# Patient Record
Sex: FEMALE | Race: WHITE | Employment: PART TIME | URBAN - NONMETROPOLITAN AREA
[De-identification: names, ages, dates, MRNs, and addresses within clinical notes are randomized per-mention and may not be internally consistent; named-entity substitution may affect disease eponyms.]

---

## 2020-11-09 ENCOUNTER — OFFICE VISIT (OUTPATIENT)
Dept: OBGYN CLINIC | Age: 30
End: 2020-11-09
Payer: COMMERCIAL

## 2020-11-09 VITALS
DIASTOLIC BLOOD PRESSURE: 74 MMHG | HEIGHT: 65 IN | WEIGHT: 140.1 LBS | SYSTOLIC BLOOD PRESSURE: 132 MMHG | BODY MASS INDEX: 23.34 KG/M2

## 2020-11-09 PROCEDURE — 99395 PREV VISIT EST AGE 18-39: CPT | Performed by: NURSE PRACTITIONER

## 2020-11-09 SDOH — HEALTH STABILITY: MENTAL HEALTH: HOW OFTEN DO YOU HAVE A DRINK CONTAINING ALCOHOL?: MONTHLY OR LESS

## 2020-11-09 NOTE — PROGRESS NOTES
YEARLY PHYSICAL    Date of service: 2020    Lynette Daniels  Is a 35 y.o.   female    PT's PCP is: No primary care provider on file. : 1990                                             Subjective:       Patient's last menstrual period was 10/18/2020. Are your menses regular: yes, only 2 since IUD removal    OB History    Para Term  AB Living   1 1 1     1   SAB TAB Ectopic Molar Multiple Live Births             1      # Outcome Date GA Lbr Reginald/2nd Weight Sex Delivery Anes PTL Lv   1 Term 1 40w0d    Vag-Spont   FILIPPO        Social History     Tobacco Use   Smoking Status Never Smoker   Smokeless Tobacco Never Used        Social History     Substance and Sexual Activity   Alcohol Use Yes    Frequency: Monthly or less       Family History   Problem Relation Age of Onset    Alzheimer's Disease Maternal Grandmother     Hypertension Father        Any family history of breast or ovarian cancer: No    Any family history of blood clots: No      Allergies: Ceclor [cefaclor]      Current Outpatient Medications:     Prenatal Vit-Fe Fumarate-FA (PRENATAL VITAMIN PO), Take by mouth, Disp: , Rfl:     Omega-3 Fatty Acids (FISH OIL PO), Take by mouth, Disp: , Rfl:     Ascorbic Acid (VITAMIN C ER PO), Take by mouth, Disp: , Rfl:     Social History     Substance and Sexual Activity   Sexual Activity Yes    Partners: Male       Any bleeding or pain with intercourse: No    Last Yearly:  2019    Last pap: 2019    Last HPV: n/a    Last Mammogram: n/a    Last Dexascan n/a    Last colorectal screen- n/a    Do you do self breast exams: encouraged monthly SBE    History reviewed. No pertinent past medical history. History reviewed. No pertinent surgical history.     Family History   Problem Relation Age of Onset    Alzheimer's Disease Maternal Grandmother     Hypertension Father        Chief Complaint   Patient presents with  Gynecologic Exam     Pap not due. PE:  Vital Signs  Blood pressure 132/74, height 5' 5\" (1.651 m), weight 140 lb 1.6 oz (63.5 kg), last menstrual period 10/18/2020. Estimated body mass index is 23.31 kg/m² as calculated from the following:    Height as of this encounter: 5' 5\" (1.651 m). Weight as of this encounter: 140 lb 1.6 oz (63.5 kg). HPI: Patient here for annual exam. Feeling well, voices no concerns. Denies breast/pelvic pain. Negative pap in 2019 per [facility]. Monthly menses since IUD removal.    Review of Systems   All other systems reviewed and are negative. Objective  Heent:   negative   Cor: regular rate and rhythm, no murmurs              Pul:clear to auscultation bilaterally- no wheezes, rales or rhonchi, normal air movement, no respiratory distress      GI: Abdomen soft, non-tender. BS normal. No masses,  No organomegaly           Extremities: normal strength, tone, and muscle mass, ROM of all joints is normal   Breasts: Breast:normal appearance, no masses or tenderness, No nipple retraction or dimpling, No nipple discharge or bleeding   Pelvic Exam: GENITAL/URINARY:  External Genitalia:  General appearance; normal, Hair distribution; normal, Lesions absent  Urethral Meatus:  Size normal, Location normal, Lesions absent, Prolapse absent  Urethra: Fullness absent, Masses absent  Bladder:  Fullness absent, Masses absent, Tenderness absent, Cystocele absent  Vagina:  General appearance normal, Estrogen effect normal, Discharge absent, Lesions absent, Pelvic support normal  Cervix:  General appearance normal, Lesions absent, Discharge absent, Tenderness absent, Enlargement absent, Nodularity absent  Uterus:  Size normal, Tenderness absent  Adenexa:   Masses absent, Tenderness absent  Anus/Perineum:  Lesions absent and Masses absent                                    Vaginal discharge: no vaginal discharge                            Assessment and Plan          Diagnosis Orders   1. Women's annual routine gynecological examination               I am having Yamilet Awkward maintain her Prenatal Vit-Fe Fumarate-FA (PRENATAL VITAMIN PO), Omega-3 Fatty Acids (FISH OIL PO), and Ascorbic Acid (VITAMIN C ER PO). Return in about 1 year (around 11/9/2021) for yearly. She was also counseled on her preventative health maintenance recommendations and follow-up. There are no Patient Instructions on file for this visit.     Ricky Don,11/10/2020 9:18 AM

## 2021-07-14 ENCOUNTER — TELEPHONE (OUTPATIENT)
Dept: OBGYN CLINIC | Age: 31
End: 2021-07-14

## 2021-07-14 NOTE — TELEPHONE ENCOUNTER
Pt called stating she recently found out she was pregnant and is taking a lot of supplements because she is a  and wondering what ones are safe to take. Patient is going to send a my chart message with a list of all the supplements she is taking.

## 2021-07-23 ENCOUNTER — TELEPHONE (OUTPATIENT)
Dept: OBGYN CLINIC | Age: 31
End: 2021-07-23

## 2021-07-23 NOTE — TELEPHONE ENCOUNTER
Caller states she has a rapid HR and she is wondering if it is normal. Patient states she is 6 weeks pregnant, but was not sure of her OBGYN's name, but knows she see's them at the [facility] office. Patient states when she was working out, her HR was 165 and just walking around it's in the 120's. Patient denies any difficulty breathing or SOB. Writer placed patient on hold to look at office guidelines and patient hung up.

## 2021-08-02 NOTE — TELEPHONE ENCOUNTER
Attempted to call patient to check on her and had to leave a message. She is to call back if still needing anything from our office.

## 2021-08-10 ENCOUNTER — OFFICE VISIT (OUTPATIENT)
Dept: OBGYN CLINIC | Age: 31
End: 2021-08-10
Payer: COMMERCIAL

## 2021-08-10 VITALS
DIASTOLIC BLOOD PRESSURE: 73 MMHG | HEIGHT: 65 IN | BODY MASS INDEX: 22.59 KG/M2 | WEIGHT: 135.6 LBS | SYSTOLIC BLOOD PRESSURE: 121 MMHG

## 2021-08-10 DIAGNOSIS — N91.1 AMENORRHEA, SECONDARY: Primary | ICD-10-CM

## 2021-08-10 PROCEDURE — G8427 DOCREV CUR MEDS BY ELIG CLIN: HCPCS | Performed by: ADVANCED PRACTICE MIDWIFE

## 2021-08-10 PROCEDURE — 99213 OFFICE O/P EST LOW 20 MIN: CPT | Performed by: ADVANCED PRACTICE MIDWIFE

## 2021-08-10 PROCEDURE — G8420 CALC BMI NORM PARAMETERS: HCPCS | Performed by: ADVANCED PRACTICE MIDWIFE

## 2021-08-10 PROCEDURE — 1036F TOBACCO NON-USER: CPT | Performed by: ADVANCED PRACTICE MIDWIFE

## 2021-08-10 ASSESSMENT — ENCOUNTER SYMPTOMS
VOMITING: 0
ABDOMINAL PAIN: 0
NAUSEA: 1

## 2021-08-10 NOTE — PROGRESS NOTES
PROBLEM VISIT     Date of service: 8/10/2021    Gita Alvarado  Is a 35 y.o.  female    PT's PCP is: No primary care provider on file. : 1990                                          Review of Systems   Constitutional: Positive for fatigue. Breast tenderness     Gastrointestinal: Positive for nausea. Negative for abdominal pain and vomiting. Genitourinary: Positive for menstrual problem (amenorrhea related to pregnancy). Negative for vaginal bleeding. Patient's last menstrual period was 2021 (exact date). OB History    Para Term  AB Living   2 1 1     1   SAB TAB Ectopic Molar Multiple Live Births             1      # Outcome Date GA Lbr Reginald/2nd Weight Sex Delivery Anes PTL Lv   2 Current            1 Term 1 40w0d    Vag-Spont   FILIPPO        Social History     Tobacco Use   Smoking Status Never Smoker   Smokeless Tobacco Never Used        Social History     Substance and Sexual Activity   Alcohol Use Yes       Allergies: Ceclor [cefaclor]      Current Outpatient Medications:     Prenatal Vit-Fe Fumarate-FA (PRENATAL VITAMIN PO), Take by mouth, Disp: , Rfl:     Omega-3 Fatty Acids (FISH OIL PO), Take by mouth, Disp: , Rfl:     Ascorbic Acid (VITAMIN C ER PO), Take by mouth, Disp: , Rfl:     Social History     Substance and Sexual Activity   Sexual Activity Yes    Partners: Male       Chief Complaint   Patient presents with    Follow-up     Pt here for viability USN f/u. Pt denies concerns. Physical Exam  Constitutional:       Appearance: Normal appearance. She is normal weight. HENT:      Head: Normocephalic. Eyes:      Pupils: Pupils are equal, round, and reactive to light. Pulmonary:      Effort: Pulmonary effort is normal.   Musculoskeletal:         General: Normal range of motion. Cervical back: Normal range of motion. Neurological:      Mental Status: She is alert and oriented to person, place, and time.    Skin:     General: Skin is warm and dry. Psychiatric:         Behavior: Behavior normal.   Vitals and nursing note reviewed. Vital Signs  Blood pressure 121/73, height 5' 5\" (1.651 m), weight 135 lb 9.6 oz (61.5 kg), last menstrual period 06/13/2021. HPI  USN f/u. USN done for viability. Patient works out daily, did with first uncomplicated pregnancy also. Taking vitamins. Normal first trimester s/s                       Results reviewed today:    USN today - viable IUP, size = dates. CXL 5cm. FHR = 175 beats. Right ovary normal.  Left ovary with corpus luteal cyst.                              Assessment and Plan          Diagnosis Orders   1. Amenorrhea, secondary       Reviewed timing of PNI/NOB. Discussed genetic testing. Discussed protein - currently eating 120 grams daily. Okay to take fish oil, vitamin c supplement. I am having Lynette Daniels maintain her Prenatal Vit-Fe Fumarate-FA (PRENATAL VITAMIN PO), Omega-3 Fatty Acids (FISH OIL PO), and Ascorbic Acid (VITAMIN C ER PO). Return in about 2 weeks (around 8/24/2021) for PNI. She was also counseled on her preventative health maintenance recommendations and follow-up. There are no Patient Instructions on file for this visit.     MAEVE Barker CNM,8/10/2021 12:33 PM                   Electronically signed by MAEVE Barker CNM

## 2021-09-01 ENCOUNTER — HOSPITAL ENCOUNTER (OUTPATIENT)
Age: 31
Setting detail: SPECIMEN
Discharge: HOME | End: 2021-09-01
Payer: COMMERCIAL

## 2021-09-01 ENCOUNTER — INITIAL PRENATAL (OUTPATIENT)
Dept: OBGYN CLINIC | Age: 31
End: 2021-09-01

## 2021-09-01 VITALS
BODY MASS INDEX: 23.19 KG/M2 | DIASTOLIC BLOOD PRESSURE: 62 MMHG | SYSTOLIC BLOOD PRESSURE: 118 MMHG | WEIGHT: 139.2 LBS | HEIGHT: 65 IN

## 2021-09-01 DIAGNOSIS — Z34.81 ENCOUNTER FOR SUPERVISION OF OTHER NORMAL PREGNANCY IN FIRST TRIMESTER: Primary | ICD-10-CM

## 2021-09-01 DIAGNOSIS — Z34.81 ENCOUNTER FOR SUPERVISION OF OTHER NORMAL PREGNANCY IN FIRST TRIMESTER: ICD-10-CM

## 2021-09-01 LAB
ABSOLUTE EOS #: 0.08 K/UL (ref 0–0.44)
ABSOLUTE IMMATURE GRANULOCYTE: 0.03 K/UL (ref 0–0.3)
ABSOLUTE LYMPH #: 1.75 K/UL (ref 1.1–3.7)
ABSOLUTE MONO #: 0.54 K/UL (ref 0.1–1.2)
AMPHETAMINE SCREEN URINE: NEGATIVE
BARBITURATE SCREEN URINE: NEGATIVE
BASOPHILS # BLD: 0 % (ref 0–2)
BASOPHILS ABSOLUTE: <0.03 K/UL (ref 0–0.2)
BENZODIAZEPINE SCREEN, URINE: NEGATIVE
BILIRUBIN URINE: NEGATIVE
BUPRENORPHINE URINE: NORMAL
CANNABINOID SCREEN URINE: NEGATIVE
COCAINE METABOLITE, URINE: NEGATIVE
COLOR: YELLOW
COMMENT UA: ABNORMAL
DIFFERENTIAL TYPE: ABNORMAL
EOSINOPHILS RELATIVE PERCENT: 1 % (ref 1–4)
GLUCOSE URINE: NEGATIVE
HCT VFR BLD CALC: 37.3 % (ref 36.3–47.1)
HEMOGLOBIN: 12.4 G/DL (ref 11.9–15.1)
HEPATITIS B SURFACE ANTIGEN: NONREACTIVE
HEPATITIS C ANTIBODY: NONREACTIVE
HIV AG/AB: NONREACTIVE
IMMATURE GRANULOCYTES: 0 %
KETONES, URINE: NEGATIVE
LEUKOCYTE ESTERASE, URINE: NEGATIVE
LYMPHOCYTES # BLD: 22 % (ref 24–43)
MCH RBC QN AUTO: 30.3 PG (ref 25.2–33.5)
MCHC RBC AUTO-ENTMCNC: 33.2 G/DL (ref 28.4–34.8)
MCV RBC AUTO: 91.2 FL (ref 82.6–102.9)
MDMA URINE: NORMAL
METHADONE SCREEN, URINE: NEGATIVE
METHAMPHETAMINE, URINE: NORMAL
MONOCYTES # BLD: 7 % (ref 3–12)
NITRITE, URINE: NEGATIVE
NRBC AUTOMATED: 0 PER 100 WBC
OPIATES, URINE: NEGATIVE
OXYCODONE SCREEN URINE: NEGATIVE
PDW BLD-RTO: 11.9 % (ref 11.8–14.4)
PH UA: 6.5 (ref 5–8)
PHENCYCLIDINE, URINE: NEGATIVE
PLATELET # BLD: 232 K/UL (ref 138–453)
PLATELET ESTIMATE: ABNORMAL
PMV BLD AUTO: 9.2 FL (ref 8.1–13.5)
PROPOXYPHENE, URINE: NORMAL
PROTEIN UA: NEGATIVE
RBC # BLD: 4.09 M/UL (ref 3.95–5.11)
RBC # BLD: ABNORMAL 10*6/UL
RUBV IGG SER QL: 308.9 IU/ML
SEG NEUTROPHILS: 70 % (ref 36–65)
SEGMENTED NEUTROPHILS ABSOLUTE COUNT: 5.63 K/UL (ref 1.5–8.1)
SPECIFIC GRAVITY UA: 1 (ref 1–1.03)
T. PALLIDUM, IGG: NONREACTIVE
TEST INFORMATION: NORMAL
TRICYCLIC ANTIDEPRESSANTS, UR: NORMAL
TURBIDITY: CLEAR
URINE HGB: NEGATIVE
UROBILINOGEN, URINE: NORMAL
WBC # BLD: 8.1 K/UL (ref 3.5–11.3)
WBC # BLD: ABNORMAL 10*3/UL

## 2021-09-01 PROCEDURE — 0500F INITIAL PRENATAL CARE VISIT: CPT | Performed by: ADVANCED PRACTICE MIDWIFE

## 2021-09-01 NOTE — PATIENT INSTRUCTIONS
Patient Education        Learning About Starting to Breastfeed  Planning ahead     Before your baby is born, plan ahead. Learn all you can about breastfeeding. This helps make breastfeeding easier. · Early in your pregnancy, talk to your doctor or midwife about breastfeeding. · Learn the basics before your baby is born. The staff at hospitals and birthing centers can help you find a lactation specialist. This person is often a nurse who has been trained to teach and advise about breastfeeding. Or you can take a breastfeeding class. · Plan ahead for times when you will need help after your baby is born. You may want to get help from friends and family. You can also join a support group to talk to others who breastfeed. · Buy the equipment you'll need. Examples are breast pads, nipple cream, extra pillows, and nursing bras. Find out about breast pumps too. Getting help from your hospital or birthing center  It's important to have support from the doctors, nurses, and hospital staff who care for you and your baby. Before it's time for you to give birth, ask about the breastfeeding policies at your hospital or birthing center. Look for a hospital or birthing center that has policies for:  · \"Rooming in. \" This policy encourages you to have your baby in the room with you. It can allow you to breastfeed more often. · Supplemental feedings. Tell the staff that your baby is to get only your breast milk from birth. If staff feed your baby water, sugar solution, or formula right after birth without a medical reason, it may make it harder for you to breastfeed. · Pacifiers or artificial nipples. Staff should not give your  these items. They may interfere with breastfeeding. · Follow-up. Find out if your hospital can help you with breastfeeding issues after you go home. See if you can get information on support groups or other contacts.  They might help if you need help setting up and staying with your breastfeeding routine. Your first feeding  It's best to start breastfeeding within 1 hour of birth. For each feeding, you go through these basic steps:  · Get ready for the feeding. Be calm and relaxed, and try not to be distracted. Get some water or juice for yourself. Use two or three pillows to help support your baby while nursing. · Find a breastfeeding position that is comfortable for you and your baby. Examples are the cradle and the football positions. Make sure the baby's head and chest are lined up straight and facing your breast. It's best to switch which breast you start with each time. · Get the baby latched on well. Your baby's mouth needs to be wide open, like a yawn. So you may need to gently touch the middle of your baby's lower lip. When your baby's mouth is open wide, quickly bring the baby onto your nipple and areola. The areola is the dark [place] around your nipple. · Provide a complete feeding. Let your baby decide how long to nurse. Be sure to burp your baby after each breast.  In the first days after birth, your breasts make a thick, yellow liquid called colostrum. This liquid gives your baby nutrients and antibodies against infection. It is all that babies need at first. Your breasts will fill with milk a few days after the birth. Talk to your doctor, midwife, or lactation specialist right away if you are having problems and aren't sure what to do. How often to breastfeed  Plan to breastfeed your baby on demand rather than setting a strict schedule. For the first 2 weeks, be prepared to breastfeed at least 8 times in a 24-hour period. In the first few days, you may need to wake a sleeping baby to feed. If you breastfeed more often, it will help your breasts to produce more milk. After you go home  After you're home, don't be afraid to call your doctor, midwife, or lactation specialist with questions. That's true even if you don't know what's bothering you.  They are used to parents of newborns calling. They can help you figure out if there is a problem, and if so, how to fix it. Plan for times when you will be apart from your baby. Use a breast pump to collect breast milk ahead of time. You can store milk in the refrigerator or freezer. Then it's ready when someone else will be taking care of your baby. Experts recommend waiting about a month until breastfeeding is going well before offering a bottle. Breastfeeding is a learned skill that gets easier over time. You are more likely to succeed if you plan ahead, learn the basic techniques, and know where to get help and support. Where can you learn more? Go to https://chpepiceweb.AvidRetail. org and sign in to your CashYou account. Enter Y067 in the OBOOK box to learn more about \"Learning About Starting to Breastfeed. \"     If you do not have an account, please click on the \"Sign Up Now\" link. Current as of: October 8, 2020               Content Version: 12.9  © 2006-2021 Healthwise, Breaker. Care instructions adapted under license by [facility] ([facility]). If you have questions about a medical condition or this instruction, always ask your healthcare professional. [place] [address] any warranty or liability for your use of this information. Patient Education        Nutrition During Pregnancy: Care Instructions  Your Care Instructions     Healthy eating when you are pregnant is important for you and your baby. It can help you feel well and have a successful pregnancy and delivery. During pregnancy your nutrition needs increase. Even if you have excellent eating habits, your doctor may recommend a multivitamin to make sure you get enough iron and folic acid. Many pregnant women wonder how much weight they should gain. In general, women who were at a healthy weight before they became pregnant should gain between 25 and 35 pounds.  Women who were overweight before pregnancy are usually advised to gain 15 to 25 pounds. Women who were underweight before pregnancy are usually advised to gain 28 to 40 pounds. Your doctor will work with you to set a weight goal that is right for you. Gaining a healthy amount of weight helps you have a healthy baby. Follow-up care is a key part of your treatment and safety. Be sure to make and go to all appointments, and call your doctor if you are having problems. It's also a good idea to know your test results and keep a list of the medicines you take. How can you care for yourself at home? · Eat plenty of fruits and vegetables. Include a variety of orange, yellow, and leafy dark-green vegetables every day. · Choose whole-grain bread, cereal, and pasta. Good choices include whole wheat bread, whole wheat pasta, brown rice, and oatmeal.  · Get 4 or more servings of milk and milk products each day. Good choices include nonfat or low-fat milk, yogurt, and cheese. If you cannot eat milk products, you can get calcium from calcium-fortified products such as orange juice, soy milk, and tofu. Other non-milk sources of calcium include leafy green vegetables, such as broccoli, kale, mustard greens, turnip greens, bok grace, and brussels sprouts. · If you eat meat, pick lower-fat types. Good choices include lean cuts of meat and chicken or turkey without the skin. · Do not eat shark, swordfish, rebel mackerel, or tilefish. They have high levels of mercury, which is dangerous to your baby. You can eat up to 12 ounces a week of fish or shellfish that have low mercury levels. Good choices include shrimp, wild salmon, pollock, and catfish. Limit some other types of fish, such as white (albacore) tuna, to 4 oz (0.1 kg) a week. · Heat lunch meats (such as turkey, ham, or bologna) to 165°F before you eat them. This reduces your risk of getting sick from a kind of bacteria that can be found in lunch meats.   · Do not eat unpasteurized soft cheeses, such as brie, feta, fresh mozzarella, and blue cheese. They have a bacteria that could harm your baby. · Limit caffeine. If you drink coffee or tea, have no more than 1 cup a day. Caffeine is also found in isis. · Do not drink any alcohol. No amount of alcohol has been found to be safe during pregnancy. · Do not diet or try to lose weight. For example, do not follow a low-carbohydrate diet. If you are overweight at the start of your pregnancy, your doctor will work with you to manage your weight gain. · Tell your doctor about all vitamins and supplements you take. When should you call for help? Watch closely for changes in your health, and be sure to contact your doctor if you have any problems. Where can you learn more? Go to https://REMOTVpeTaboolaeb.Mobly. org and sign in to your Hedvig account. Enter Y785 in the Intact Vascular box to learn more about \"Nutrition During Pregnancy: Care Instructions. \"     If you do not have an account, please click on the \"Sign Up Now\" link. Current as of: October 8, 2020               Content Version: 12.9  © 2006-2021 [employer]. Care instructions adapted under license by [facility] ([facility]). If you have questions about a medical condition or this instruction, always ask your healthcare professional. [address] any warranty or liability for your use of this information. Patient Education        Weeks 10 to 14 of Your Pregnancy: Care Instructions  Overview     By weeks 10 to 15 of your pregnancy, the placenta has formed inside your uterus. The placenta's main job is to give your baby oxygen and nutrients through the umbilical cord. It's possible to hear your baby's heartbeat with a special ultrasound device. Your baby's organs are developing. The arms and legs can bend. This is a good time to think about testing for birth defects. There are two types of tests: screening and diagnostic. Screening tests show the chance that a baby has a certain birth defect. They can't tell you for sure that your baby has a problem. Diagnostic tests show if a baby has a certain birth defect. It's your choice whether to have these tests. You and your partner can talk to your doctor or midwife about tests for birth defects. Follow-up care is a key part of your treatment and safety. Be sure to make and go to all appointments, and call your doctor if you are having problems. It's also a good idea to know your test results and keep a list of the medicines you take. How can you care for yourself at home? Decide about tests  · You can have screening tests and diagnostic tests to check for birth defects. The decision to have a test for birth defects is personal. Think about your age, your chance of passing on a family disease, your need to know about any problems, and what you might do after you have the test results. ? Quadruple (quad) blood test. This screening test can be done between 15 and 22 weeks of pregnancy. It checks the amount of four substances in your blood. The doctor looks at these test results, along with your age and other factors, to find out the chance that your baby may have certain problems. ? Amniocentesis. This diagnostic test is used to look for chromosomal problems in the baby's cells. It can be done between 15 and 20 weeks of pregnancy, usually around week 16.  ? Nuchal translucency test. This test uses ultrasound to measure the thickness of the area at the back of the baby's neck. An increase in the thickness can be an early sign of Down syndrome. ? Chorionic villus sampling (CVS). This is a test that looks for certain genetic problems with your baby. The same genes that are in your baby are in the placenta. A small piece of the placenta is taken out and tested. This test is done when you are 10 to 13 weeks pregnant. Ease discomfort  · Slow down and take naps when you feel tired. · If your emotions swing, talk to someone.   · If your gums bleed, try a softer toothbrush. If your gums are puffy and bleed a lot, see your dentist.  · If you feel dizzy:  ? Get up slowly after sitting or lying down. ? Drink plenty of fluids. ? Eat small snacks to keep your blood sugar stable. ? Put your head between your legs as though you were tying your shoelaces. ? Lie down with your legs higher than your head. Use pillows to prop up your feet. · If you have a headache:  ? Lie down. ? Ask your partner or a good friend for a neck massage. ? Try cool cloths over your forehead or across the back of your neck. ? Use acetaminophen (Tylenol) for pain relief. Do not use nonsteroidal anti-inflammatory drugs (NSAIDs), such as ibuprofen (Advil, Motrin) or naproxen (Aleve), unless your doctor says it is okay. · If you have a nosebleed, pinch your nose gently, and hold it for a short while. To prevent nosebleeds, try massaging a small dab of petroleum jelly, such as Vaseline, in your nostrils. · If your nose is stuffed up, try saline (saltwater) nose sprays. Do not use decongestant sprays. Care for your breasts  · Wear a bra that gives you good support. · Know that changes in your breasts are normal.  ? Your breasts may get larger and more tender. Tenderness usually gets better by 12 weeks. ? Your nipples may get darker and larger, and small bumps around your nipples may show more. ? The veins in your chest and breasts may show more. Where can you learn more? Go to https://GameWorld Associteshalinaeb.Social Yuppies. org and sign in to your Kakoona account. Enter Z689 in the RobotsLAB box to learn more about \"Weeks 10 to 14 of Your Pregnancy: Care Instructions. \"     If you do not have an account, please click on the \"Sign Up Now\" link. Current as of: October 8, 2020               Content Version: 12.9  © 6234-8485 Healthwise, Incorporated. Care instructions adapted under license by [facility] ([facility]).  If you have questions about a medical condition or this instruction, always ask your healthcare professional. [address] any warranty or liability for your use of this information.

## 2021-09-01 NOTE — PROGRESS NOTES
Here for PNI, no complaints today. Denies hx of MRSA. Desires Prequel and unsure about MSAFP. Prequel drawn today with . Has NOB appt on 9/9/21. Questions answered and forms signed.

## 2021-09-02 LAB
AB TITER: NORMAL
ABO/RH: NORMAL
ANTIBODY IDENTIFICATION: NORMAL
ANTIBODY IDENTIFICATION: NORMAL
ANTIBODY SCREEN: POSITIVE
CULTURE: NORMAL
Lab: NORMAL
SPECIMEN DESCRIPTION: NORMAL

## 2021-09-07 ENCOUNTER — TELEPHONE (OUTPATIENT)
Dept: OBGYN CLINIC | Age: 31
End: 2021-09-07

## 2021-09-07 NOTE — TELEPHONE ENCOUNTER
Pt called again wanting a phone call today about her lab results. Could someone please give her a call.  THANKS!!

## 2021-09-07 NOTE — TELEPHONE ENCOUNTER
[facility] and is requesting a phone call re: the next step. She does have an appt on Thursday for NOB with BR but pt has waited all weekend to talk to someone about the results.

## 2021-09-09 ENCOUNTER — HOSPITAL ENCOUNTER (OUTPATIENT)
Age: 31
Setting detail: SPECIMEN
Discharge: HOME | End: 2021-09-09
Payer: COMMERCIAL

## 2021-09-09 ENCOUNTER — INITIAL PRENATAL (OUTPATIENT)
Dept: OBGYN CLINIC | Age: 31
End: 2021-09-09

## 2021-09-09 VITALS — SYSTOLIC BLOOD PRESSURE: 118 MMHG | WEIGHT: 141.8 LBS | DIASTOLIC BLOOD PRESSURE: 60 MMHG | BODY MASS INDEX: 23.6 KG/M2

## 2021-09-09 DIAGNOSIS — O09.91 HIGH-RISK PREGNANCY IN FIRST TRIMESTER: Primary | ICD-10-CM

## 2021-09-09 DIAGNOSIS — O09.91 HIGH-RISK PREGNANCY IN FIRST TRIMESTER: ICD-10-CM

## 2021-09-09 PROCEDURE — 0501F PRENATAL FLOW SHEET: CPT | Performed by: ADVANCED PRACTICE MIDWIFE

## 2021-09-09 NOTE — PROGRESS NOTES
Deborah Beaver is a 35 y.o. female 12w4d      The patient was seen and evaluated. Fetal movement was Absent . No contractions or leakage of fluid. Signs and symptoms of  labor as well as labor were reviewed. Genetic testing was ordered - pending. MSAFP was not ordered for a 15-20 week gestational age window - will order next visit.  Reviewed - also consulted Dr Srini Atwood. Patient is Rh negative, FOB is blood type O+, no hx of blood transfusion, trauma, or rhogam this pregnancy. Did have Anti D antibody screen and titer was <1:1.  Per Dr Srini Atwood repeat titer monthly and if starts to increase see MFM. Otherwise growth to be monitored in third trimester - patient is agreeable with this plan. Discussed if desire MFM consult at any point just to ask. Consents to GC/CT screening today. Dates were reviewed with the patient. An 18-22 week anatomy ultrasound has been ordered. The patient will return to the office for her next visit in 4 weeks. See antepartum flow sheet. Antibody titer every month  Growth q4w in third trimester    Does pt have history of infant delivery before 37 weeks: No  Previous c/s: No  Prenatal testing: Desires Prequel, unsure about MSAFP  Ped: Dr Yolis Gupta  Blood type:  Rhogam:   Flu shot:   TDAP (27-36 wks):  GBS:  GTT:  GC/CT:  30 week Growth:  MRSA: Denies hx        Assessment:  1. Deborah Beaver is a 35 y.o. female  2.   3. 12w4d    There are no problems to display for this patient. Diagnosis Orders   1. High-risk pregnancy in first trimester  C.trachomatis N.gonorrhoeae DNA         Plan:  Return in about 4 weeks (around 10/7/2021) for OB Follow Up. There are no Patient Instructions on file for this visit.

## 2021-09-10 LAB
C TRACH DNA GENITAL QL NAA+PROBE: NEGATIVE
N. GONORRHOEAE DNA: NEGATIVE
SPECIMEN DESCRIPTION: NORMAL

## 2021-09-29 ENCOUNTER — ROUTINE PRENATAL (OUTPATIENT)
Dept: OBGYN CLINIC | Age: 31
End: 2021-09-29

## 2021-09-29 VITALS — BODY MASS INDEX: 23.73 KG/M2 | WEIGHT: 142.6 LBS | SYSTOLIC BLOOD PRESSURE: 130 MMHG | DIASTOLIC BLOOD PRESSURE: 71 MMHG

## 2021-09-29 DIAGNOSIS — O26.899 CRAMPING AFFECTING PREGNANCY, ANTEPARTUM: Primary | ICD-10-CM

## 2021-09-29 DIAGNOSIS — R10.9 CRAMPING AFFECTING PREGNANCY, ANTEPARTUM: Primary | ICD-10-CM

## 2021-09-29 LAB
BILIRUBIN, POC: NORMAL
BLOOD URINE, POC: NORMAL
CLARITY, POC: NORMAL
COLOR, POC: NORMAL
GLUCOSE URINE, POC: NORMAL
KETONES, POC: NORMAL
LEUKOCYTE EST, POC: NORMAL
NITRITE, POC: NORMAL
PH, POC: 6
PROTEIN, POC: NORMAL
SPECIFIC GRAVITY, POC: 1.01
UROBILINOGEN, POC: NORMAL

## 2021-09-29 PROCEDURE — 1036F TOBACCO NON-USER: CPT | Performed by: NURSE PRACTITIONER

## 2021-09-29 PROCEDURE — G8427 DOCREV CUR MEDS BY ELIG CLIN: HCPCS | Performed by: NURSE PRACTITIONER

## 2021-09-29 PROCEDURE — 0502F SUBSEQUENT PRENATAL CARE: CPT | Performed by: NURSE PRACTITIONER

## 2021-09-29 PROCEDURE — G8420 CALC BMI NORM PARAMETERS: HCPCS | Performed by: NURSE PRACTITIONER

## 2021-09-29 NOTE — PROGRESS NOTES
C/O cramping since late yesterday afternoon. States was very busy yesterday and is stressed out over upcoming repeat titers. Feeling fetal flutters. Denies urinary or vaginal sx.

## 2021-09-29 NOTE — PROGRESS NOTES
Patient presents today for E- OB. Reports cramping yesterday afternoon and occasional sharp pains with quick movements. Cramping has improved so far today. Reports appropriate hydration and nutrition. Admits to being very busy yesterday, worked out, cleaned the house and organized closets. Denies signs/symptoms or urinary or vaginal infection. Very nervous/stressed about upcoming repeat titers, reassurance provided. Feeling flutters.  Encouraged rest.

## 2021-10-04 ENCOUNTER — ROUTINE PRENATAL (OUTPATIENT)
Dept: OBGYN CLINIC | Age: 31
End: 2021-10-04

## 2021-10-04 ENCOUNTER — HOSPITAL ENCOUNTER (OUTPATIENT)
Age: 31
Setting detail: SPECIMEN
Discharge: HOME | End: 2021-10-04
Payer: COMMERCIAL

## 2021-10-04 VITALS — DIASTOLIC BLOOD PRESSURE: 62 MMHG | BODY MASS INDEX: 24.33 KG/M2 | WEIGHT: 146.2 LBS | SYSTOLIC BLOOD PRESSURE: 122 MMHG

## 2021-10-04 DIAGNOSIS — Z34.82 PRENATAL CARE, SUBSEQUENT PREGNANCY, SECOND TRIMESTER: ICD-10-CM

## 2021-10-04 DIAGNOSIS — Z34.82 PRENATAL CARE, SUBSEQUENT PREGNANCY, SECOND TRIMESTER: Primary | ICD-10-CM

## 2021-10-04 LAB
AB TITER: NORMAL
ANTIBODY IDENTIFICATION: NORMAL
ANTIBODY IDENTIFICATION: NORMAL
ANTIBODY SCREEN: POSITIVE

## 2021-10-04 PROCEDURE — 0502F SUBSEQUENT PRENATAL CARE: CPT | Performed by: ADVANCED PRACTICE MIDWIFE

## 2021-10-04 NOTE — PROGRESS NOTES
Mali Seymour is a 28 y.o. female 16w1d      The patient was seen and evaluated. Fetal movement was Present. No contractions or leakage of fluid. Signs and symptoms of  labor as well as labor were reviewed. Genetic testing was not ordered and reviewed. MSAFP was not ordered for a 15-20 week gestational age window.  Reviewed. Dates were reviewed with the patient. An 18-22 week anatomy ultrasound has been ordered. The patient will return to the office for her next visit in 4 weeks. See antepartum flow sheet. Repeat antibody titers today - aware we will call with results. Has had a few episodes of round ligament discomfort - discussed normal pregnancy changes and s/s to call office for. Recently got leigh ann thorpe - reviewed heart rate recommendations in pregnancy for exercise activities    Antibody titer every month  Growth q4w in third trimester    Does pt have history of infant delivery before 37 weeks: No  Previous c/s: No  Prenatal testing: Desires Prequel, unsure about MSAFP  Ped: Dr Matthew Espinoza  Blood type:  Rhogam:   Flu shot:   TDAP (27-36 wks):  GBS:  GTT:  GC/CT:  30 week Growth:  MRSA: Denies hx        Assessment:  1. Mali Seymour is a 28 y.o. female  2.   3. 16w1d    There are no problems to display for this patient. Diagnosis Orders   1. Prenatal care, subsequent pregnancy, second trimester  Antibody Screen         Plan:  Return in about 4 weeks (around 2021) for OB Follow Up, Anatomy USN. There are no Patient Instructions on file for this visit.

## 2021-11-01 ENCOUNTER — HOSPITAL ENCOUNTER (OUTPATIENT)
Age: 31
Setting detail: SPECIMEN
Discharge: HOME | End: 2021-11-01
Payer: COMMERCIAL

## 2021-11-01 ENCOUNTER — ROUTINE PRENATAL (OUTPATIENT)
Dept: OBGYN CLINIC | Age: 31
End: 2021-11-01

## 2021-11-01 VITALS — WEIGHT: 147.2 LBS | SYSTOLIC BLOOD PRESSURE: 120 MMHG | DIASTOLIC BLOOD PRESSURE: 60 MMHG | BODY MASS INDEX: 24.5 KG/M2

## 2021-11-01 DIAGNOSIS — O09.92 HIGH-RISK PREGNANCY IN SECOND TRIMESTER: ICD-10-CM

## 2021-11-01 DIAGNOSIS — O09.92 HIGH-RISK PREGNANCY IN SECOND TRIMESTER: Primary | ICD-10-CM

## 2021-11-01 PROCEDURE — 0502F SUBSEQUENT PRENATAL CARE: CPT | Performed by: ADVANCED PRACTICE MIDWIFE

## 2021-11-01 NOTE — PROGRESS NOTES
Yazmin Kerns is a 28 y.o. female 25w1d    The patient was seen and evaluated. There was positive fetal movements. No contractions or leakage of fluid. Signs and symptoms of  labor as well as labor were reviewed. Genetic testing was reviewed and found to be normal. The patients anatomy ultrasound has been completed and reviewed with patient - discussed pyelectasis. A 28 week lab panel was discussed    The S/S of Pre-Eclampsia were reviewed with the patient in detail. She is to report any of these if they occur. She currently denies any of these. The patient is RH negative Rhogam Ordered no - at upcoming visit    Repeat antibody titers today    The patient was instructed on fetal kick counts and was encouraged to monitor every 8 hours. This is to begin at 28 weeks gestation. She was instructed that the baby should move at a minimum of ten times within one hour after a meal. The patient was instructed to lay down on her left side twenty minutes after eating and count movements for up to one hour with a target value of ten movements. She was instructed to notify the office if she did not make that target after two attempts or if after any attempt there was less than four movements. Antibody titer every month  Growth q4w in third trimester    Does pt have history of infant delivery before 37 weeks: No  Previous c/s: No  Prenatal testing: Desires Prequel, unsure about MSAFP  Ped: Dr Ilia Coker  Blood type:  Rhogam:   Flu shot:   TDAP (27-36 wks):  GBS:  GTT:  GC/CT:  30 week Growth:  MRSA: Denies hx      Assessment:  1. Yazmin Kerns is a 28 y.o. female  2.   3. 20w1d    There are no problems to display for this patient. Diagnosis Orders   1. High-risk pregnancy in second trimester  Antibody Screen         Plan:  The patient will return to the office for her next visit in 4 weeks. See antepartum flow sheet.

## 2021-11-10 ENCOUNTER — OFFICE VISIT (OUTPATIENT)
Dept: OBGYN CLINIC | Age: 31
End: 2021-11-10
Payer: COMMERCIAL

## 2021-11-10 DIAGNOSIS — O36.8121 DECREASED FETAL MOVEMENT AFFECTING MANAGEMENT OF PREGNANCY IN SECOND TRIMESTER, FETUS 1: Primary | ICD-10-CM

## 2021-11-10 PROCEDURE — 99211 OFF/OP EST MAY X REQ PHY/QHP: CPT | Performed by: OBSTETRICS & GYNECOLOGY

## 2021-11-10 PROCEDURE — G8420 CALC BMI NORM PARAMETERS: HCPCS | Performed by: OBSTETRICS & GYNECOLOGY

## 2021-11-10 PROCEDURE — G8427 DOCREV CUR MEDS BY ELIG CLIN: HCPCS | Performed by: OBSTETRICS & GYNECOLOGY

## 2021-11-10 NOTE — PROGRESS NOTES
Doppler FHTs of 156 found and patient reassured. Reminded patient that she may not notice regular movement this early in pregnancy, but can call for [facility] if needing reassurance in the future.

## 2021-11-30 ENCOUNTER — ROUTINE PRENATAL (OUTPATIENT)
Dept: OBGYN CLINIC | Age: 31
End: 2021-11-30
Payer: COMMERCIAL

## 2021-11-30 ENCOUNTER — HOSPITAL ENCOUNTER (OUTPATIENT)
Age: 31
Setting detail: SPECIMEN
Discharge: HOME | End: 2021-11-30

## 2021-11-30 VITALS — WEIGHT: 153.4 LBS | BODY MASS INDEX: 25.53 KG/M2 | DIASTOLIC BLOOD PRESSURE: 62 MMHG | SYSTOLIC BLOOD PRESSURE: 120 MMHG

## 2021-11-30 DIAGNOSIS — O09.92 HIGH-RISK PREGNANCY IN SECOND TRIMESTER: ICD-10-CM

## 2021-11-30 DIAGNOSIS — O09.92 HIGH-RISK PREGNANCY IN SECOND TRIMESTER: Primary | ICD-10-CM

## 2021-11-30 LAB
AB TITER: NORMAL
ANTIBODY IDENTIFICATION: NORMAL
ANTIBODY IDENTIFICATION: NORMAL
ANTIBODY SCREEN: NORMAL

## 2021-11-30 PROCEDURE — 0502F SUBSEQUENT PRENATAL CARE: CPT | Performed by: ADVANCED PRACTICE MIDWIFE

## 2021-11-30 PROCEDURE — 36415 COLL VENOUS BLD VENIPUNCTURE: CPT | Performed by: ADVANCED PRACTICE MIDWIFE

## 2021-11-30 NOTE — PROGRESS NOTES
Sneha Edouard is a 28 y.o. female 24w2d    The patient was seen and evaluated. There was positive fetal movements. No contractions or leakage of fluid. Signs and symptoms of  labor as well as labor were reviewed. Genetic testing was reviewed and found to be normal. The patients anatomy ultrasound has been completed and reviewed with patient. A 28 week lab panel was ordered. This includes a (HH, 1 hr GTT). The patient is to complete this in the next two to four weeks. The S/S of Pre-Eclampsia were reviewed with the patient in detail. She is to report any of these if they occur. She currently denies any of these. The patient is RH negative Rhogam Ordered no (will do at upcoming visit)    Antibody screen and titer today    The patient was instructed on fetal kick counts and was encouraged to monitor every 8 hours. This is to begin at 28 weeks gestation. She was instructed that the baby should move at a minimum of ten times within one hour after a meal. The patient was instructed to lay down on her left side twenty minutes after eating and count movements for up to one hour with a target value of ten movements. She was instructed to notify the office if she did not make that target after two attempts or if after any attempt there was less than four movements. Antibody titer every month  Growth q4w in third trimester    Does pt have history of infant delivery before 37 weeks: No  Previous c/s: No  Prenatal testing: Desires Prequel, unsure about MSAFP  Ped: Dr Adriano Moran  Blood type:  Rhogam:   Flu shot:   TDAP (27-36 wks):  GBS:  GTT:  GC/CT:  30 week Growth:  MRSA: Denies hx      Assessment:  1. Sneha Edouard is a 28 y.o. female  2.   3. 24w2d    There are no problems to display for this patient. Diagnosis Orders   1.  High-risk pregnancy in second trimester  Glucose tolerance, 1 hour    CBC Auto Differential    Antibody Screen         Plan:  The patient will return to the office for her next visit in 4 weeks. See antepartum flow sheet.

## 2021-12-27 ENCOUNTER — TELEPHONE (OUTPATIENT)
Dept: OBGYN CLINIC | Age: 31
End: 2021-12-27

## 2021-12-27 ENCOUNTER — ROUTINE PRENATAL (OUTPATIENT)
Dept: OBGYN CLINIC | Age: 31
End: 2021-12-27

## 2021-12-27 ENCOUNTER — HOSPITAL ENCOUNTER (OUTPATIENT)
Age: 31
Setting detail: SPECIMEN
Discharge: HOME | End: 2021-12-27

## 2021-12-27 VITALS — DIASTOLIC BLOOD PRESSURE: 70 MMHG | WEIGHT: 153.9 LBS | BODY MASS INDEX: 25.61 KG/M2 | SYSTOLIC BLOOD PRESSURE: 120 MMHG

## 2021-12-27 DIAGNOSIS — O09.93 HIGH-RISK PREGNANCY IN THIRD TRIMESTER: Primary | ICD-10-CM

## 2021-12-27 DIAGNOSIS — O09.92 HIGH-RISK PREGNANCY IN SECOND TRIMESTER: ICD-10-CM

## 2021-12-27 DIAGNOSIS — O09.93 HIGH-RISK PREGNANCY IN THIRD TRIMESTER: ICD-10-CM

## 2021-12-27 DIAGNOSIS — Z3A.28 28 WEEKS GESTATION OF PREGNANCY: ICD-10-CM

## 2021-12-27 LAB
AB TITER: NORMAL
ABSOLUTE EOS #: 0.07 K/UL (ref 0–0.44)
ABSOLUTE IMMATURE GRANULOCYTE: 0.04 K/UL (ref 0–0.3)
ABSOLUTE LYMPH #: 0.99 K/UL (ref 1.1–3.7)
ABSOLUTE MONO #: 0.51 K/UL (ref 0.1–1.2)
ANTIBODY IDENTIFICATION: NORMAL
ANTIBODY IDENTIFICATION: NORMAL
ANTIBODY SCREEN: POSITIVE
BASOPHILS # BLD: 0 % (ref 0–2)
BASOPHILS ABSOLUTE: 0.03 K/UL (ref 0–0.2)
DIFFERENTIAL TYPE: ABNORMAL
EOSINOPHILS RELATIVE PERCENT: 1 % (ref 1–4)
GLUCOSE ADMINISTRATION: NORMAL
GLUCOSE TOLERANCE SCREEN 50G: 70 MG/DL (ref 70–135)
HCT VFR BLD CALC: 38.4 % (ref 36.3–47.1)
HEMOGLOBIN: 12.9 G/DL (ref 11.9–15.1)
IMMATURE GRANULOCYTES: 1 %
LYMPHOCYTES # BLD: 12 % (ref 24–43)
MCH RBC QN AUTO: 32 PG (ref 25.2–33.5)
MCHC RBC AUTO-ENTMCNC: 33.6 G/DL (ref 28.4–34.8)
MCV RBC AUTO: 95.3 FL (ref 82.6–102.9)
MONOCYTES # BLD: 6 % (ref 3–12)
NRBC AUTOMATED: 0 PER 100 WBC
PDW BLD-RTO: 12.1 % (ref 11.8–14.4)
PLATELET # BLD: 195 K/UL (ref 138–453)
PLATELET ESTIMATE: ABNORMAL
PMV BLD AUTO: 9.9 FL (ref 8.1–13.5)
RBC # BLD: 4.03 M/UL (ref 3.95–5.11)
RBC # BLD: ABNORMAL 10*6/UL
SEG NEUTROPHILS: 80 % (ref 36–65)
SEGMENTED NEUTROPHILS ABSOLUTE COUNT: 6.5 K/UL (ref 1.5–8.1)
WBC # BLD: 8.1 K/UL (ref 3.5–11.3)
WBC # BLD: ABNORMAL 10*3/UL

## 2021-12-27 PROCEDURE — 0502F SUBSEQUENT PRENATAL CARE: CPT | Performed by: ADVANCED PRACTICE MIDWIFE

## 2021-12-27 NOTE — TELEPHONE ENCOUNTER
Please let patient know - talked with dr Michelle Cotton - will order rhogam at next visit but will have ot be done at [facility] or [facility].   Not something that can be done in an office setting

## 2021-12-27 NOTE — PROGRESS NOTES
Destiny Stephenson is a 28 y.o. female 29w1d    The patient was seen and evaluated. There was positive fetal movements. No contractions or leakage of fluid. Signs and symptoms of  labor as well as labor were reviewed. The S/S of Pre-Eclampsia were reviewed with the patient in detail. She is to report any of these if they occur. She currently denies any of these. The patient had her 28 week labs in process. Hospital Outpatient Visit on 2021   Component Date Value Ref Range Status    Antibody Screen 2021 NOT TESTED   Final    Antibody ID 2021 Anti-D Present   Final    Antibody ID 2021 Anti-D Present   Final    Ab Titer 2021 <1:1 AHG   Final       Antibody titer every month  Growth q4w in third trimester    Does pt have history of infant delivery before 37 weeks: No  Previous c/s: No  Prenatal testing: Desires Prequel, unsure about MSAFP  Ped: Dr Caden Don  Blood type: A Neg  Rhogam:   Flu shot:   TDAP (27-36 wks):  GBS:  GTT:  GC/CT:  30 week Growth:  MRSA: Denies hx      T-Dap Vaccine (27-36 weeks): awaiting    The patient was instructed on fetal kick counts and is encouraged to monitor every 8 hours. She was instructed that the baby should move at a minimum of ten times within one hour after a meal. The patient was instructed to lay down on her left side twenty minutes after eating and count movements for up to one hour with a target value of ten movements. She was instructed to notify the office if she did not make that target after two attempts or if after any attempt there was less than four movements. The patient reports that the fetal movement targets have been made Yes.  Testing:  Growth next visit    Assessment:  1. Destiny Stephenson is a 28 y.o. female  2.   3. 28w1d    There are no problems to display for this patient. Diagnosis Orders   1.  High-risk pregnancy in third trimester  Antibody Screen   2. 28 weeks gestation of pregnancy Plan:  The patient will return to the office for her next visit in 2 weeks. See antepartum flow sheet. Visits will continue every 2 weeks in the third trimester. At 36 weeks will have GBS swab performed and begin weekly visits.   Consulted Dr Leida Estes regarding rhogam administration - patient aware we will let her know recommendations once she responds

## 2021-12-27 NOTE — TELEPHONE ENCOUNTER
Dr Brittany Cunha -     Please review this - patient is rh negative but has a positive antibody screen this pregnancy. Unknown cause of antibody screen being positive but identification is for anti d which we know is most often associated with rhogam administration. She has not yet had rhogam this pregnancy. With titer being so low would you suggest proceeding with a rhogam injection in this pregnancy? I know in theory if positive may not be necessary.     Thanks

## 2022-01-10 ENCOUNTER — ROUTINE PRENATAL (OUTPATIENT)
Dept: OBGYN CLINIC | Age: 32
End: 2022-01-10
Payer: COMMERCIAL

## 2022-01-10 VITALS — WEIGHT: 154 LBS | BODY MASS INDEX: 25.63 KG/M2 | SYSTOLIC BLOOD PRESSURE: 120 MMHG | DIASTOLIC BLOOD PRESSURE: 72 MMHG

## 2022-01-10 DIAGNOSIS — O26.899 RH NEGATIVE STATE IN ANTEPARTUM PERIOD: ICD-10-CM

## 2022-01-10 DIAGNOSIS — O09.93 HIGH-RISK PREGNANCY IN THIRD TRIMESTER: Primary | ICD-10-CM

## 2022-01-10 DIAGNOSIS — Z67.91 RH NEGATIVE STATE IN ANTEPARTUM PERIOD: ICD-10-CM

## 2022-01-10 PROCEDURE — 36415 COLL VENOUS BLD VENIPUNCTURE: CPT | Performed by: ADVANCED PRACTICE MIDWIFE

## 2022-01-10 PROCEDURE — 0502F SUBSEQUENT PRENATAL CARE: CPT | Performed by: ADVANCED PRACTICE MIDWIFE

## 2022-01-10 NOTE — PROGRESS NOTES
Mitesh Ferrari is a 28 y.o. female 30w1d    The patient was seen and evaluated. There was positive fetal movements. No contractions or leakage of fluid. Signs and symptoms of  labor as well as labor were reviewed. The S/S of Pre-Eclampsia were reviewed with the patient in detail. She is to report any of these if they occur. She currently denies any of these. Had left sided pain yesterday - improved today. Also reports increased stress - BONIFACIO passed away yesterday    Rhogam order given to patient - will do this at [facility]    The patient had her 28 week labs completed.   Hospital Outpatient Visit on 2021   Component Date Value Ref Range Status    Antibody Screen 2021 POSITIVE   Final    Antibody ID 2021 Anti-D Present   Final    WBC 2021 8.1  3.5 - 11.3 k/uL Final    RBC 2021 4.03  3.95 - 5.11 m/uL Final    Hemoglobin 2021 12.9  11.9 - 15.1 g/dL Final    Hematocrit 2021 38.4  36.3 - 47.1 % Final    MCV 2021 95.3  82.6 - 102.9 fL Final    MCH 2021 32.0  25.2 - 33.5 pg Final    MCHC 2021 33.6  28.4 - 34.8 g/dL Final    RDW 2021 12.1  11.8 - 14.4 % Final    Platelets  195  138 - 453 k/uL Final    MPV 2021 9.9  8.1 - 13.5 fL Final    NRBC Automated 2021 0.0  0.0 per 100 WBC Final    Differential Type 2021 NOT REPORTED   Final    Seg Neutrophils 2021 80* 36 - 65 % Final    Lymphocytes 2021 12* 24 - 43 % Final    Monocytes 2021 6  3 - 12 % Final    Eosinophils % 2021 1  1 - 4 % Final    Basophils 2021 0  0 - 2 % Final    Immature Granulocytes 2021 1* 0 % Final    Segs Absolute 2021 6.50  1.50 - 8.10 k/uL Final    Absolute Lymph # 2021 0.99* 1.10 - 3.70 k/uL Final    Absolute Mono # 2021 0.51  0.10 - 1.20 k/uL Final    Absolute Eos # 2021 0.07  0.00 - 0.44 k/uL Final    Basophils Absolute 2021 0.03  0.00 - 0.20 k/uL Final    Absolute Immature Granulocyte 2021 0.04  0.00 - 0.30 k/uL Final    WBC Morphology 2021 NOT REPORTED   Final    RBC Morphology 2021 NOT REPORTED   Final    Platelet Estimate 4249 NOT REPORTED   Final    GLU ADMN 2021 Glucola   Final    Glucose tolerance screen 50g 2021 70  70 - 135 mg/dL Final    Antibody ID 2021 Anti-D Present   Final    Ab Titer 2021 <1:1 AHG   Final       Antibody titer every month  Growth q4w in third trimester    Does pt have history of infant delivery before 37 weeks: No  Previous c/s: No  Prenatal testing: Desires Prequel, unsure about MSAFP  Ped: Dr Jana Saavedra  Blood type: A Neg  Rhogam:   Flu shot:   TDAP (27-36 wks):  GBS:  GTT: Normal  GC/CT:  30 week Growth:  MRSA: Denies hx      T-Dap Vaccine (27-36 weeks): awaiting    The patient was instructed on fetal kick counts and is encouraged to monitor every 8 hours. She was instructed that the baby should move at a minimum of ten times within one hour after a meal. The patient was instructed to lay down on her left side twenty minutes after eating and count movements for up to one hour with a target value of ten movements. She was instructed to notify the office if she did not make that target after two attempts or if after any attempt there was less than four movements. The patient reports that the fetal movement targets have been made Yes.  Testing:  EFW 58%, breech, CXL 5cm, YAYA 13.5cm, bilateral pyelectasis 3.6mm at greatest on the right. Assessment:  1. Nova Einstein is a 28 y.o. female  2.   3. 30w1d    There are no problems to display for this patient. Diagnosis Orders   1. High-risk pregnancy in third trimester     2. Rh negative state in antepartum period  Rhogam Immune Globulin, Antepartum    ABO/RH         Plan:  The patient will return to the office for her next visit in 2 weeks. See antepartum flow sheet. Visits will continue every 2 weeks in the third trimester. At 36 weeks will have GBS swab performed and begin weekly visits. Growth every 4 weeks.    Antibody titer next visit

## 2022-01-17 ENCOUNTER — TELEPHONE (OUTPATIENT)
Dept: OBGYN CLINIC | Age: 32
End: 2022-01-17

## 2022-01-17 DIAGNOSIS — R63.0 DECREASED APPETITE: ICD-10-CM

## 2022-01-17 DIAGNOSIS — R50.9 FEVER AND CHILLS: ICD-10-CM

## 2022-01-17 DIAGNOSIS — R52 GENERALIZED BODY ACHES: Primary | ICD-10-CM

## 2022-01-18 ENCOUNTER — NURSE TRIAGE (OUTPATIENT)
Dept: OTHER | Age: 32
End: 2022-01-18

## 2022-01-18 ENCOUNTER — TELEPHONE (OUTPATIENT)
Dept: OBGYN | Age: 32
End: 2022-01-18

## 2022-01-18 NOTE — TELEPHONE ENCOUNTER
Patient called stating that she is 31 weeks and has just received a positive COVID-19 test. She is asking if there is anything she should do because she is unvaccinated. Patient reports that the body aches and fever she had yesterday have passed. She is feeling better today, and denies respiratory distress. Per the doctor's protocols patient asked to call the office in the morning. Patient was directed to the ED if she has difficulty breathing.

## 2022-01-18 NOTE — TELEPHONE ENCOUNTER
Pt called in today saying she is feeling worse. C/o body aches and has chill last night and her throat hurts. Temp today was 99.5. Denies vomiting or diarrhea. Pt is requesting covid swab and flu swab. Per CONNIE Gee okay to send. Pt has appt at [facility] today at 2:55pm for swabs.

## 2022-01-18 NOTE — TELEPHONE ENCOUNTER
Reason for Disposition   Health Information question, no triage required and triager able to answer question    Answer Assessment - Initial Assessment Questions  1. REASON FOR CALL or QUESTION: \"What is your reason for calling today? \" or \"How can I best help you? \" or \"What question do you have that I can help answer? \"      Patient called stating that she is 31 weeks and has just received a positive COVID-19 test. She is asking if there is anything she should do because she is unvaccinated. Patient reports that the body aches and fever she had yesterday have passed. She is feeling better today, and denies respiratory distress. Per the doctor's protocols patient asked to call the office in the morning. Patient was directed to the ED if she has difficulty breathing.     Protocols used: INFORMATION ONLY CALL - NO TRIAGE-[facility]

## 2022-01-19 NOTE — TELEPHONE ENCOUNTER
When orders were faxed to [facility] today I had starred the comment to call me stat results this evening - no results have been called as of yet. Please get official copy of results first thing in morning as I know patient will be calling for them. If positive flu - okay for tamiflu 105mg PO BID x5 days, dispense #10 (most effective if started within 48 hours of symptom onset)    If positive COVID - could consider use of monoclonal antibody therapy if some available at [facility]. Unfortunately current shortages and new variant have posed some issues for access and effectiveness. Due to not being in office today - if would like to try to proceed with this please see if [facility] or Dr Dat Galdamez can facilitate the orders. Would need to send copy of positive swab with patient to hospital pharmacy for review of patient case to see about candidacy for infusion. Infusion is done at [facility] and would also be monitored for short time after infusion to ensure no reactions. Otherwise - due to COVID in third trimester will need to start wkly NST/BPP at 32 weeks so schedule at least the first week of that. The effects of COVID can effect placental function so would like to keep close eye on Nai and baby although most do quite well. In addition for symptom management - okay for mucinex, flonase, robitussin, tylenol cold, vicks to chest, humidifier, tylenol, emergent c.  Hydration is huge! Rest.  Advance diet as tolerated. If ANY shortness of breath of chest pain the needs to present to ER for evaluation. Thank you!

## 2022-01-20 ENCOUNTER — TELEPHONE (OUTPATIENT)
Dept: OBGYN CLINIC | Age: 32
End: 2022-01-20

## 2022-01-20 NOTE — TELEPHONE ENCOUNTER
Spoke with pt and she is getting her Rhogam injection tomorrow. Pt states she is feeling better and has a little SOB when up and doing lots of work. Pt states baby is moving but not as much as before she got sick. Pt states she is also not eating much.

## 2022-01-20 NOTE — TELEPHONE ENCOUNTER
I received a call from Susi at [facility] lab that they were not able to run the green top flu culture that was collected on 1/18. Per Susi, the did the COVID swab, but there was no communication that there was an issue with the flu culture until 2 days later and now it is to old to run the culture. Trino Rodirguez is made aware that we will not be getting results. Patient's COVID results were also supposed to be STAT called to Trino [facility] and we have not received any results. Susi will fax them to the office.

## 2022-01-21 ENCOUNTER — HOSPITAL ENCOUNTER (OUTPATIENT)
Dept: INFUSION THERAPY | Age: 32
Discharge: HOME | End: 2022-01-21
Payer: COMMERCIAL

## 2022-01-21 ENCOUNTER — HOSPITAL ENCOUNTER (OUTPATIENT)
Age: 32
Discharge: HOME | End: 2022-01-21
Payer: COMMERCIAL

## 2022-01-21 VITALS
SYSTOLIC BLOOD PRESSURE: 116 MMHG | RESPIRATION RATE: 18 BRPM | TEMPERATURE: 97.8 F | DIASTOLIC BLOOD PRESSURE: 66 MMHG | HEART RATE: 73 BPM

## 2022-01-21 DIAGNOSIS — R50.9 FEVER AND CHILLS: ICD-10-CM

## 2022-01-21 DIAGNOSIS — R52 GENERALIZED BODY ACHES: ICD-10-CM

## 2022-01-21 DIAGNOSIS — Z67.91 RH NEGATIVE STATE IN ANTEPARTUM PERIOD: ICD-10-CM

## 2022-01-21 DIAGNOSIS — O26.899 RH NEGATIVE STATE IN ANTEPARTUM PERIOD: ICD-10-CM

## 2022-01-21 DIAGNOSIS — R63.0 DECREASED APPETITE: ICD-10-CM

## 2022-01-21 PROCEDURE — 6360000002 HC RX W HCPCS: Performed by: ADVANCED PRACTICE MIDWIFE

## 2022-01-21 PROCEDURE — 86901 BLOOD TYPING SEROLOGIC RH(D): CPT

## 2022-01-21 PROCEDURE — 86870 RBC ANTIBODY IDENTIFICATION: CPT

## 2022-01-21 PROCEDURE — 86850 RBC ANTIBODY SCREEN: CPT

## 2022-01-21 PROCEDURE — 96372 THER/PROPH/DIAG INJ SC/IM: CPT

## 2022-01-21 RX ADMIN — HUMAN RHO(D) IMMUNE GLOBULIN 300 MCG: 300 INJECTION, SOLUTION INTRAMUSCULAR at 12:24

## 2022-01-22 LAB
ABO/RH: NORMAL
ANTIBODY IDENTIFICATION: NORMAL
ANTIBODY SCREEN: POSITIVE
BLD PROD TYP BPU: NORMAL
BLD PROD TYP BPU: NORMAL
DISPENSE STATUS BLOOD BANK: NORMAL
HISTORY CHECK: NORMAL
Lab: 1
TRANSFUSION STATUS: NORMAL
UNIT DIVISION: 0
UNIT NUMBER: NORMAL

## 2022-01-24 ENCOUNTER — ROUTINE PRENATAL (OUTPATIENT)
Dept: OBGYN CLINIC | Age: 32
End: 2022-01-24

## 2022-01-24 ENCOUNTER — PROCEDURE VISIT (OUTPATIENT)
Dept: OBGYN CLINIC | Age: 32
End: 2022-01-24
Payer: COMMERCIAL

## 2022-01-24 VITALS — BODY MASS INDEX: 25.56 KG/M2 | DIASTOLIC BLOOD PRESSURE: 78 MMHG | WEIGHT: 153.6 LBS | SYSTOLIC BLOOD PRESSURE: 104 MMHG

## 2022-01-24 VITALS — DIASTOLIC BLOOD PRESSURE: 78 MMHG | SYSTOLIC BLOOD PRESSURE: 104 MMHG | WEIGHT: 153.9 LBS | BODY MASS INDEX: 25.61 KG/M2

## 2022-01-24 DIAGNOSIS — O09.93 HIGH-RISK PREGNANCY IN THIRD TRIMESTER: ICD-10-CM

## 2022-01-24 DIAGNOSIS — O98.513 COVID-19 AFFECTING PREGNANCY IN THIRD TRIMESTER: Primary | ICD-10-CM

## 2022-01-24 DIAGNOSIS — Z34.83 PRENATAL CARE, SUBSEQUENT PREGNANCY, THIRD TRIMESTER: Primary | ICD-10-CM

## 2022-01-24 DIAGNOSIS — U07.1 COVID-19 AFFECTING PREGNANCY IN THIRD TRIMESTER: Primary | ICD-10-CM

## 2022-01-24 DIAGNOSIS — U07.1 COVID-19 AFFECTING PREGNANCY IN THIRD TRIMESTER: ICD-10-CM

## 2022-01-24 DIAGNOSIS — O98.513 COVID-19 AFFECTING PREGNANCY IN THIRD TRIMESTER: ICD-10-CM

## 2022-01-24 PROCEDURE — 59025 FETAL NON-STRESS TEST: CPT | Performed by: ADVANCED PRACTICE MIDWIFE

## 2022-01-24 PROCEDURE — 0502F SUBSEQUENT PRENATAL CARE: CPT | Performed by: ADVANCED PRACTICE MIDWIFE

## 2022-01-24 NOTE — PROGRESS NOTES
Benjamin Hammond is here at 32w1d for an NST due to positive antibody screen in pregnancy and covid in third trimester    FHT;        Baseline Heart Rate:  140        Accelerations:  present       Long Term Variability:  moderate       Decelerations:  absent         Contraction frequency: no regular uterine activity      reactive    Plan: continue wkly nst/bpp

## 2022-01-24 NOTE — PROGRESS NOTES
Elliott Walton is a 28 y.o. female 32w1d    The patient was seen and evaluated. There was positive fetal movements. No contractions or leakage of fluid. Signs and symptoms of  labor as well as labor were reviewed. The S/S of Pre-Eclampsia were reviewed with the patient in detail. She is to report any of these if they occur. She currently denies any of these. Recent COVID - feels good enough to return to the gym today. Has cough in morning and evenings but lungs clear with auscultation. Discussed continue wkly NST/BPP. No lab today therefore will have antibody screen on Thursday when here for BPP - patient agreeable. The patient had her 28 week labs completed.   Hospital Outpatient Visit on 2022   Component Date Value Ref Range Status    Product Code 2022 MANUFACTURED PRODUCT   Final    Number of Units 2022 1   Final    ABO/Rh 2022 A NEGATIVE   Final    Antibody Screen 2022 POSITIVE   Final    History Check 2022    Final                    Value:A  NEGATIVE  ANTI D PRESENT      Antibody ID 2022 Anti-D Present   Final    Unit Number 2022 [id number]   Final    Product Code 2022 RHIG   Final    Unit Divison 2022 00   Final    Dispense Status 2022 TRANSFUSED   Final    Transfusion Status 2022 OK TO TRANSFUSE   Final   Hospital Outpatient Visit on 2021   Component Date Value Ref Range Status    Antibody Screen 2021 POSITIVE   Final    Antibody ID 2021 Anti-D Present   Final    WBC 2021 8.1  3.5 - 11.3 k/uL Final    RBC 2021 4.03  3.95 - 5.11 m/uL Final    Hemoglobin 2021 12.9  11.9 - 15.1 g/dL Final    Hematocrit 2021 38.4  36.3 - 47.1 % Final    MCV 2021 95.3  82.6 - 102.9 fL Final    MCH 2021 32.0  25.2 - 33.5 pg Final    MCHC 2021 33.6  28.4 - 34.8 g/dL Final    RDW 2021 12.1  11.8 - 14.4 % Final    Platelets  277 534 - 676 k/uL Final    MPV 12/27/2021 9.9  8.1 - 13.5 fL Final    NRBC Automated 12/27/2021 0.0  0.0 per 100 WBC Final    Differential Type 12/27/2021 NOT REPORTED   Final    Seg Neutrophils 12/27/2021 80* 36 - 65 % Final    Lymphocytes 12/27/2021 12* 24 - 43 % Final    Monocytes 12/27/2021 6  3 - 12 % Final    Eosinophils % 12/27/2021 1  1 - 4 % Final    Basophils 12/27/2021 0  0 - 2 % Final    Immature Granulocytes 12/27/2021 1* 0 % Final    Segs Absolute 12/27/2021 6.50  1.50 - 8.10 k/uL Final    Absolute Lymph # 12/27/2021 0.99* 1.10 - 3.70 k/uL Final    Absolute Mono # 12/27/2021 0.51  0.10 - 1.20 k/uL Final    Absolute Eos # 12/27/2021 0.07  0.00 - 0.44 k/uL Final    Basophils Absolute 12/27/2021 0.03  0.00 - 0.20 k/uL Final    Absolute Immature Granulocyte 12/27/2021 0.04  0.00 - 0.30 k/uL Final    WBC Morphology 12/27/2021 NOT REPORTED   Final    RBC Morphology 12/27/2021 NOT REPORTED   Final    Platelet Estimate 11/07/5343 NOT REPORTED   Final    GLU ADMN 12/27/2021 Glucola   Final    Glucose tolerance screen 50g 12/27/2021 70  70 - 135 mg/dL Final    Antibody ID 12/27/2021 Anti-D Present   Final    Ab Titer 12/27/2021 <1:1 AHG   Final       Antibody titer every month  Growth q4w in third trimester  1/18 COVID + therefore wkly NST/BPP as positive in third trimester (s/s started 1/16)    Does pt have history of infant delivery before 37 weeks: No  Previous c/s: No  Prenatal testing: Desires Prequel, unsure about MSAFP  Ped: Dr Yan Oneil  Blood type: A Neg  Rhogam: Done  Flu shot:   TDAP (27-36 wks):  GBS:  GTT: Normal  GC/CT:  30 week Growth:  MRSA: Denies hx      T-Dap Vaccine (27-36 weeks): awaiting    The patient was instructed on fetal kick counts and is encouraged to monitor every 8 hours.  She was instructed that the baby should move at a minimum of ten times within one hour after a meal. The patient was instructed to lay down on her left side twenty minutes after eating and count movements for up to one hour with a target value of ten movements. She was instructed to notify the office if she did not make that target after two attempts or if after any attempt there was less than four movements. The patient reports that the fetal movement targets have been made Yes.  Testing:  NST reactive today    Assessment:  1Rashid Mcnair is a 28 y.o. female  2.   3. 32w1d    Patient Active Problem List    Diagnosis Date Noted    COVID-19 affecting pregnancy in third trimester 2022        Diagnosis Orders   1. Prenatal care, subsequent pregnancy, third trimester  Antibody Screen   2. High-risk pregnancy in third trimester  Antibody Screen   3. COVID-19 affecting pregnancy in third trimester           Plan:  The patient will return to the office for her next visit in 1 weeks. See antepartum flow sheet. Visits will continue every 2 weeks in the third trimester. At 36 weeks will have GBS swab performed and begin weekly visits.  Testing Indicated: Yes  Scheduled with [facility] Office Staff - Pt notified:  Yes

## 2022-01-27 ENCOUNTER — HOSPITAL ENCOUNTER (OUTPATIENT)
Age: 32
Setting detail: SPECIMEN
Discharge: HOME | End: 2022-01-27

## 2022-01-27 DIAGNOSIS — O09.93 HIGH-RISK PREGNANCY IN THIRD TRIMESTER: ICD-10-CM

## 2022-01-27 DIAGNOSIS — Z34.83 PRENATAL CARE, SUBSEQUENT PREGNANCY, THIRD TRIMESTER: ICD-10-CM

## 2022-01-31 ENCOUNTER — PROCEDURE VISIT (OUTPATIENT)
Dept: OBGYN CLINIC | Age: 32
End: 2022-01-31
Payer: COMMERCIAL

## 2022-01-31 ENCOUNTER — ROUTINE PRENATAL (OUTPATIENT)
Dept: OBGYN CLINIC | Age: 32
End: 2022-01-31
Payer: COMMERCIAL

## 2022-01-31 VITALS — WEIGHT: 156.2 LBS | SYSTOLIC BLOOD PRESSURE: 112 MMHG | DIASTOLIC BLOOD PRESSURE: 68 MMHG | BODY MASS INDEX: 25.99 KG/M2

## 2022-01-31 VITALS — WEIGHT: 156 LBS | SYSTOLIC BLOOD PRESSURE: 112 MMHG | DIASTOLIC BLOOD PRESSURE: 68 MMHG | BODY MASS INDEX: 25.96 KG/M2

## 2022-01-31 DIAGNOSIS — O09.93 HIGH-RISK PREGNANCY IN THIRD TRIMESTER: Primary | ICD-10-CM

## 2022-01-31 DIAGNOSIS — U07.1 COVID-19 AFFECTING PREGNANCY IN THIRD TRIMESTER: Primary | ICD-10-CM

## 2022-01-31 DIAGNOSIS — O98.513 COVID-19 AFFECTING PREGNANCY IN THIRD TRIMESTER: Primary | ICD-10-CM

## 2022-01-31 PROCEDURE — 59025 FETAL NON-STRESS TEST: CPT | Performed by: ADVANCED PRACTICE MIDWIFE

## 2022-01-31 PROCEDURE — 90715 TDAP VACCINE 7 YRS/> IM: CPT | Performed by: ADVANCED PRACTICE MIDWIFE

## 2022-01-31 PROCEDURE — 0502F SUBSEQUENT PRENATAL CARE: CPT | Performed by: ADVANCED PRACTICE MIDWIFE

## 2022-01-31 PROCEDURE — 90471 IMMUNIZATION ADMIN: CPT | Performed by: ADVANCED PRACTICE MIDWIFE

## 2022-01-31 NOTE — PROGRESS NOTES
Xochitl Mantilla is here at 33w1d for an NST due to [place] in pregnancy, positive antibody screen    FHT;        Baseline Heart Rate:  130        Accelerations:  present       Long Term Variability:  moderate       Decelerations:  absent         Contraction frequency: Occ ctx      reactive    Plan: Continue wkly NST/BPP

## 2022-01-31 NOTE — PROGRESS NOTES
Josemanuel Gibson is a 28 y.o. female 33w1d    The patient was seen and evaluated. There was positive fetal movements. No contractions or leakage of fluid. Signs and symptoms of  labor as well as labor were reviewed. The S/S of Pre-Eclampsia were reviewed with the patient in detail. She is to report any of these if they occur. She currently denies any of these. The patient had her 28 week labs completed. Hospital Outpatient Visit on 2022   Component Date Value Ref Range Status    Antibody Screen 2022 POSITIVE   Final    Antibody ID 2022 Anti-D Present   Final    Antibody ID 2022 Anti-D Present   Final    Ab Titer 2022  1:1 AHG   Final   Hospital Outpatient Visit on 2022   Component Date Value Ref Range Status    Product Code 2022 MANUFACTURED PRODUCT   Final    Number of Units 2022 1   Final    ABO/Rh 2022 A NEGATIVE   Final    Antibody Screen 2022 POSITIVE   Final    History Check 2022    Final                    Value:A  NEGATIVE  ANTI D PRESENT      Antibody ID 2022 Anti-D Present   Final    Unit Number 2022 [id number]   Final    Product Code 2022 RHIG   Final    Unit Divison 2022 00   Final    Dispense Status 2022 TRANSFUSED   Final    Transfusion Status 2022 OK TO TRANSFUSE   Final       Antibody titer every month  Growth q4w in third trimester   COVID + therefore wkly NST/BPP as positive in third trimester (s/s started )    Does pt have history of infant delivery before 37 weeks: No  Previous c/s: No  Prenatal testing: Desires Prequel, unsure about MSAFP  Ped: Dr Irais José  Blood type: A Neg  Rhogam: Done  Flu shot:   TDAP (27-36 wks):  GBS:  GTT: Normal  GC/CT:  30 week Growth:  MRSA: Denies hx      T-Dap Vaccine (27-36 weeks): Given today    The patient was instructed on fetal kick counts and is encouraged to monitor every 8 hours.  She was instructed that the baby should move at a minimum of ten times within one hour after a meal. The patient was instructed to lay down on her left side twenty minutes after eating and count movements for up to one hour with a target value of ten movements. She was instructed to notify the office if she did not make that target after two attempts or if after any attempt there was less than four movements. The patient reports that the fetal movement targets have been made Yes.  Testing:  NST today - reactive    Assessment:  1Rashid Laboy is a 28 y.o. female  2.   3. 33w1d    Patient Active Problem List    Diagnosis Date Noted    COVID-19 affecting pregnancy in third trimester 2022        Diagnosis Orders   1. High-risk pregnancy in third trimester           Plan:  The patient will return to the office for her next visit in 1 weeks. See antepartum flow sheet. Visits will continue every 2 weeks in the third trimester. At 36 weeks will have GBS swab performed and begin weekly visits.  Testing Indicated: Yes  Scheduled with [facility] Office Staff - Pt notified:  Yes

## 2022-02-07 ENCOUNTER — PROCEDURE VISIT (OUTPATIENT)
Dept: OBGYN CLINIC | Age: 32
End: 2022-02-07
Payer: COMMERCIAL

## 2022-02-07 ENCOUNTER — ROUTINE PRENATAL (OUTPATIENT)
Dept: OBGYN CLINIC | Age: 32
End: 2022-02-07

## 2022-02-07 VITALS — DIASTOLIC BLOOD PRESSURE: 76 MMHG | WEIGHT: 155 LBS | BODY MASS INDEX: 25.79 KG/M2 | SYSTOLIC BLOOD PRESSURE: 110 MMHG

## 2022-02-07 VITALS — WEIGHT: 155.8 LBS | SYSTOLIC BLOOD PRESSURE: 110 MMHG | DIASTOLIC BLOOD PRESSURE: 76 MMHG | BODY MASS INDEX: 25.93 KG/M2

## 2022-02-07 DIAGNOSIS — O09.93 HIGH-RISK PREGNANCY IN THIRD TRIMESTER: Primary | ICD-10-CM

## 2022-02-07 DIAGNOSIS — U07.1 COVID-19 AFFECTING PREGNANCY IN THIRD TRIMESTER: Primary | ICD-10-CM

## 2022-02-07 DIAGNOSIS — O98.513 COVID-19 AFFECTING PREGNANCY IN THIRD TRIMESTER: Primary | ICD-10-CM

## 2022-02-07 PROCEDURE — 59025 FETAL NON-STRESS TEST: CPT | Performed by: ADVANCED PRACTICE MIDWIFE

## 2022-02-07 PROCEDURE — 0502F SUBSEQUENT PRENATAL CARE: CPT | Performed by: ADVANCED PRACTICE MIDWIFE

## 2022-02-07 NOTE — PROGRESS NOTES
Mariela Rain is a 28 y.o. female 34w1d    The patient was seen and evaluated. There was positive fetal movements. No contractions or leakage of fluid. Signs and symptoms of  labor as well as labor were reviewed. The S/S of Pre-Eclampsia were reviewed with the patient in detail. She is to report any of these if they occur. She currently denies any of these. The patient had her 28 week labs completed. Hospital Outpatient Visit on 2022   Component Date Value Ref Range Status    Antibody Screen 2022 POSITIVE   Final    Antibody ID 2022 Anti-D Present   Final    Antibody ID 2022 Anti-D Present   Final    Ab Titer 2022  1:1 AHG   Final   Hospital Outpatient Visit on 2022   Component Date Value Ref Range Status    Product Code 2022 MANUFACTURED PRODUCT   Final    Number of Units 2022 1   Final    ABO/Rh 2022 A NEGATIVE   Final    Antibody Screen 2022 POSITIVE   Final    History Check 2022    Final                    Value:A  NEGATIVE  ANTI D PRESENT      Antibody ID 2022 Anti-D Present   Final    Unit Number 2022 [id number]   Final    Product Code 2022 RHIG   Final    Unit Divison 2022 00   Final    Dispense Status 2022 TRANSFUSED   Final    Transfusion Status 2022 OK TO TRANSFUSE   Final       Antibody titer every month  Growth q4w in third trimester   COVID + therefore wkly NST/BPP as positive in third trimester (s/s started )    Does pt have history of infant delivery before 37 weeks: No  Previous c/s: No  Prenatal testing: Desires Prequel, unsure about MSAFP  Ped: Dr Vijay Ruth  Blood type: A Neg  Rhogam: Done  Flu shot:   TDAP (27-36 wks): 2022  GBS:  GTT: Normal  GC/CT:  30 week Growth:  MRSA: Denies hx      T-Dap Vaccine (27-36 weeks): completed    The patient was instructed on fetal kick counts and is encouraged to monitor every 8 hours.  She was instructed that the baby should move at a minimum of ten times within one hour after a meal. The patient was instructed to lay down on her left side twenty minutes after eating and count movements for up to one hour with a target value of ten movements. She was instructed to notify the office if she did not make that target after two attempts or if after any attempt there was less than four movements. The patient reports that the fetal movement targets have been made Yes.  Testing:  NST reactive today    Assessment:  1. Becca Watkins is a 28 y.o. female  2.   3. 34w1d    Patient Active Problem List    Diagnosis Date Noted    COVID-19 affecting pregnancy in third trimester 2022        Diagnosis Orders   1. High-risk pregnancy in third trimester           Plan:  The patient will return to the office for her next visit in 1 weeks. See antepartum flow sheet. Visits will continue every 2 weeks in the third trimester. At 36 weeks will have GBS swab performed and begin weekly visits.  Testing Indicated: Yes  Scheduled with [facility] Office Staff - Pt notified:  Yes

## 2022-02-07 NOTE — PROGRESS NOTES
Ashley Kang is here at 34w1d for an NST due to positive antibody screen in pregnancy and COVID in third trimester    FHT;        Baseline Heart Rate:  130        Accelerations:  present       Long Term Variability:  moderate       Decelerations:  absent         Contraction frequency: No regular uterine activity      reactive    Plan: Continue twice/wk monitoring

## 2022-02-14 ENCOUNTER — PROCEDURE VISIT (OUTPATIENT)
Dept: OBGYN CLINIC | Age: 32
End: 2022-02-14
Payer: COMMERCIAL

## 2022-02-14 ENCOUNTER — ROUTINE PRENATAL (OUTPATIENT)
Dept: OBGYN CLINIC | Age: 32
End: 2022-02-14

## 2022-02-14 VITALS — WEIGHT: 156.8 LBS | DIASTOLIC BLOOD PRESSURE: 78 MMHG | BODY MASS INDEX: 26.09 KG/M2 | SYSTOLIC BLOOD PRESSURE: 126 MMHG

## 2022-02-14 DIAGNOSIS — Z3A.35 35 WEEKS GESTATION OF PREGNANCY: ICD-10-CM

## 2022-02-14 DIAGNOSIS — U07.1 COVID-19 AFFECTING PREGNANCY IN THIRD TRIMESTER: Primary | ICD-10-CM

## 2022-02-14 DIAGNOSIS — O09.93 HIGH-RISK PREGNANCY IN THIRD TRIMESTER: Primary | ICD-10-CM

## 2022-02-14 DIAGNOSIS — O98.513 COVID-19 AFFECTING PREGNANCY IN THIRD TRIMESTER: Primary | ICD-10-CM

## 2022-02-14 PROCEDURE — 59025 FETAL NON-STRESS TEST: CPT | Performed by: ADVANCED PRACTICE MIDWIFE

## 2022-02-14 PROCEDURE — 0502F SUBSEQUENT PRENATAL CARE: CPT | Performed by: NURSE PRACTITIONER

## 2022-02-14 NOTE — PROGRESS NOTES
Pepper Irwin is here at 35w1d for an NST due to positive antibody screen in pregnancy and COVID in third trimester of pregnancy    FHT;        Baseline Heart Rate:  140        Accelerations:  present       Long Term Variability:  moderate       Decelerations:  absent         Contraction frequency: Did have ctx with fetal movement      reactive    Plan: Seeing MT today and will discuss ctx with her

## 2022-02-14 NOTE — PROGRESS NOTES
Avelina Enriquez is a 28 y.o. female 35w1d    The patient was seen and evaluated. There was positive fetal movements. No  leakage of fluid. Montague Lala contractions noted, mainly with movement. Signs and symptoms of  labor as well as labor were reviewed. The S/S of Pre-Eclampsia were reviewed with the patient in detail. She is to report any of these if they occur. She currently denies any of these. The patient had her 28 week labs completed.   Hospital Outpatient Visit on 2022   Component Date Value Ref Range Status    Antibody Screen 2022 POSITIVE   Final    Antibody ID 2022 Anti-D Present   Final    Antibody ID 2022 Anti-D Present   Final    Ab Titer 2022  1:1 AHG   Final   Hospital Outpatient Visit on 2022   Component Date Value Ref Range Status    Product Code 2022 MANUFACTURED PRODUCT   Final    Number of Units 2022 1   Final    ABO/Rh 2022 A NEGATIVE   Final    Antibody Screen 2022 POSITIVE   Final    History Check 2022    Final                    Value:A  NEGATIVE  ANTI D PRESENT      Antibody ID 2022 Anti-D Present   Final    Unit Number 2022 [id number]   Final    Product Code 2022 RHIG   Final    Unit Divison 2022 00   Final    Dispense Status 2022 TRANSFUSED   Final    Transfusion Status 2022 OK TO TRANSFUSE   Final       Antibody titer every month  Growth q4w in third trimester   COVID + therefore wkly NST/BPP as positive in third trimester (s/s started )    Does pt have history of infant delivery before 37 weeks: No  Previous c/s: No  Prenatal testing: Desires Prequel, unsure about MSAFP  Ped: Dr Latonia Campa  Blood type: A Neg  Rhogam: Done  Flu shot:   TDAP (27-36 wks): 2022  GBS:  GTT: Normal  GC/CT:  30 week Growth:  MRSA: Denies hx      T-Dap Vaccine (27-36 weeks): completed    The patient was instructed on fetal kick counts and is encouraged to monitor every 8 hours. She was instructed that the baby should move at a minimum of ten times within one hour after a meal. The patient was instructed to lay down on her left side twenty minutes after eating and count movements for up to one hour with a target value of ten movements. She was instructed to notify the office if she did not make that target after two attempts or if after any attempt there was less than four movements. The patient reports that the fetal movement targets have been made Yes.  Testing:  Twice weekly testing     Assessment:  1. Cristobal Crane is a 28 y.o. female  2.   3. 35w1d    Patient Active Problem List    Diagnosis Date Noted    COVID-19 affecting pregnancy in third trimester 2022        Diagnosis Orders   1. High-risk pregnancy in third trimester     2. 35 weeks gestation of pregnancy           Plan:  The patient will return to the office for her next visit in two days. See antepartum flow sheet. Visits will continue every 2 weeks in the third trimester. At 36 weeks will have GBS swab performed and begin weekly visits.

## 2022-02-17 ENCOUNTER — TELEPHONE (OUTPATIENT)
Dept: OBGYN CLINIC | Age: 32
End: 2022-02-17

## 2022-02-17 NOTE — TELEPHONE ENCOUNTER
This is a common finding - especially with one gender over the other. It is stable from first being noted at 30 week growth USN. If <10mm we do not refer or even do additional w/u for this  After delivery peds will ensure can void adequately and some will do a renal USN after birth or within first couple of months if >10mm or if issues voiding.

## 2022-02-17 NOTE — TELEPHONE ENCOUNTER
Pt called and left VM stating she was in for USN and was concerned about there being water in baby's kidneys.

## 2022-02-22 ENCOUNTER — PROCEDURE VISIT (OUTPATIENT)
Dept: OBGYN CLINIC | Age: 32
End: 2022-02-22
Payer: COMMERCIAL

## 2022-02-22 ENCOUNTER — ROUTINE PRENATAL (OUTPATIENT)
Dept: OBGYN CLINIC | Age: 32
End: 2022-02-22

## 2022-02-22 ENCOUNTER — HOSPITAL ENCOUNTER (OUTPATIENT)
Age: 32
Setting detail: SPECIMEN
Discharge: HOME | End: 2022-02-22

## 2022-02-22 VITALS — SYSTOLIC BLOOD PRESSURE: 118 MMHG | DIASTOLIC BLOOD PRESSURE: 82 MMHG | BODY MASS INDEX: 25.63 KG/M2 | WEIGHT: 154 LBS

## 2022-02-22 VITALS — WEIGHT: 154.8 LBS | SYSTOLIC BLOOD PRESSURE: 118 MMHG | DIASTOLIC BLOOD PRESSURE: 82 MMHG | BODY MASS INDEX: 25.76 KG/M2

## 2022-02-22 DIAGNOSIS — U07.1 COVID-19 AFFECTING PREGNANCY IN THIRD TRIMESTER: ICD-10-CM

## 2022-02-22 DIAGNOSIS — O09.93 HIGH-RISK PREGNANCY IN THIRD TRIMESTER: ICD-10-CM

## 2022-02-22 DIAGNOSIS — O98.513 COVID-19 AFFECTING PREGNANCY IN THIRD TRIMESTER: ICD-10-CM

## 2022-02-22 DIAGNOSIS — U07.1 COVID-19 AFFECTING PREGNANCY IN THIRD TRIMESTER: Primary | ICD-10-CM

## 2022-02-22 DIAGNOSIS — O09.93 HIGH-RISK PREGNANCY IN THIRD TRIMESTER: Primary | ICD-10-CM

## 2022-02-22 DIAGNOSIS — O98.513 COVID-19 AFFECTING PREGNANCY IN THIRD TRIMESTER: Primary | ICD-10-CM

## 2022-02-22 PROCEDURE — 0502F SUBSEQUENT PRENATAL CARE: CPT | Performed by: ADVANCED PRACTICE MIDWIFE

## 2022-02-22 PROCEDURE — 59025 FETAL NON-STRESS TEST: CPT | Performed by: ADVANCED PRACTICE MIDWIFE

## 2022-02-22 NOTE — PROGRESS NOTES
Bentley Lozano is a 28 y.o. female 37w1d      The patient was seen and evaluated. There was positive fetal movements. No contractions or leakage of fluid. Signs and symptoms of labor were reviewed. The S/S of Pre-Eclampsia were reviewed with the patient in detail. She is to report any of these if they occur. She currently denies any of these. Infant vertex is palpated but not engaged in pelvis - more oblique lie. With fundal pressure able to palpate vertex    The patient was instructed on fetal kick counts and is encouraged to complete every 8 hours. She was instructed that the baby should move at a minimum of ten times within one hour after a meal. The patient was instructed to lay down on her left side twenty minutes after eating and count movements for up to one hour with a target value of ten movements. She was instructed to notify the office if she did not make that target after two attempts or if after any attempt there was less than four movements. The patient reports that the fetal movement targets have been made Yes. Antibody titer every month  Growth q4w in third trimester  1/18 COVID + therefore wkly NST/BPP as positive in third trimester (s/s started 1/16)    Does pt have history of infant delivery before 37 weeks: No  Previous c/s: No  Prenatal testing: Desires Prequel, unsure about MSAFP  Ped: Dr Rommel Amador  Blood type: A Neg  Rhogam: Done  Flu shot:   TDAP (27-36 wks): 1/31/2022  GBS:  GTT: Normal  GC/CT:  30 week Growth:  MRSA: Denies hx      T-Dap Vaccine Completed (27-36 weeks): Yes    Allergies: Allergies as of 02/22/2022 - Fully Reviewed 02/22/2022   Allergen Reaction Noted    Ceclor [cefaclor]  11/09/2020         Group Beta Strep collection was completed. Yes today  GBS Results:   No visits with results within 3 Week(s) from this visit.    Latest known visit with results is:   Hospital Outpatient Visit on 01/27/2022   Component Date Value Ref Range Status    Antibody Screen 01/27/2022 POSITIVE   Final    Antibody ID 2022 Anti-D Present   Final    Antibody ID 2022 Anti-D Present   Final    Ab Titer 2022  1:1 AHG   Final       If not done prior in pregnancy or if had previous positive result in this pregnancy, GC/CT were collected. No.    The patient was counseled on the mandatory call ahead policy. She has been instructed to call the office at anytime prior to going into the hospital so the on-call physician may direct her to the appropriate facility for care. Exceptions were reviewed including but not limited to: Decreased fetal movement, vaginal Bleeding or hemorrhage, trauma, readily expectant delivery, or any instance where she feels 911 should be utilized. The patient was counseled on Labor & Delivery.  Testing:  NST today        Assessment:  1. Bentley Lozano is a 28 y.o. female  2.   3. 36w2d    Patient Active Problem List    Diagnosis Date Noted    COVID-19 affecting pregnancy in third trimester 2022        Diagnosis Orders   1. High-risk pregnancy in third trimester  Culture, Strep B Screen, Vaginal/Rectal    Antibody Screen   2. COVID-19 affecting pregnancy in third trimester           Plan:  The patient will return to the office for her next visit in 1 weeks. See antepartum flow sheet.    Continue twice/wk monitoring

## 2022-02-22 NOTE — PROGRESS NOTES
Jordan Nunez is here at [facility] for an NST due to positive antibody screen in pregnancy and COVID in third trimester    FHT;        Baseline Heart Rate:  140        Accelerations:  present       Long Term Variability:  moderate       Decelerations:  absent         Contraction frequency: No regular uterine activity      reactive    Plan: Continue twice/wk monitoring

## 2022-02-24 ENCOUNTER — TELEPHONE (OUTPATIENT)
Dept: OBGYN CLINIC | Age: 32
End: 2022-02-24

## 2022-02-24 ENCOUNTER — ROUTINE PRENATAL (OUTPATIENT)
Dept: OBGYN CLINIC | Age: 32
End: 2022-02-24
Payer: COMMERCIAL

## 2022-02-24 VITALS — SYSTOLIC BLOOD PRESSURE: 128 MMHG | WEIGHT: 157.4 LBS | DIASTOLIC BLOOD PRESSURE: 86 MMHG | BODY MASS INDEX: 26.19 KG/M2

## 2022-02-24 DIAGNOSIS — O36.8130 DECREASED FETAL MOVEMENTS IN THIRD TRIMESTER, SINGLE OR UNSPECIFIED FETUS: Primary | ICD-10-CM

## 2022-02-24 DIAGNOSIS — Z33.1 INCIDENTAL PREGNANCY: ICD-10-CM

## 2022-02-24 DIAGNOSIS — Z3A.36 36 WEEKS GESTATION OF PREGNANCY: ICD-10-CM

## 2022-02-24 PROCEDURE — 99213 OFFICE O/P EST LOW 20 MIN: CPT | Performed by: ADVANCED PRACTICE MIDWIFE

## 2022-02-24 PROCEDURE — 59025 FETAL NON-STRESS TEST: CPT | Performed by: ADVANCED PRACTICE MIDWIFE

## 2022-02-24 NOTE — TELEPHONE ENCOUNTER
Dr Amirah Arredondo - please review and give recommendations    Patient is a multip who has hx of term vaginal delivery. Pregnancy currently complicated by third trimester COVID and positive antibody screen with stable titers for duration of pregnancy. She has been having wkly NST/BPP - last week infant vertex, at visit Tuesday with fundal pressure able to feel head but was not in pelvis at all and quite active that day. Today found breech again - unstable lie. Her YAYA is 14cm, posterior placenta, appropriate growth. Would you consider trial with ECV knowing that if successful IOL and if unsuccessful then c/s? I did let her know that breech delivery is unlikely at [facility]. Looking forward to your thoughts and then will share with patient.   Thanks

## 2022-02-24 NOTE — PROGRESS NOTES
EOB    Here for scheduled BPP secondary to high risk pregnancy but reported DFM and infant found to now be breech - was vertex last week. There was decreased fetal movements over the last 1.5 days. Reports infant extremely active on Tuesday at visit but since then has been much less active. Patient extremely concerned about this. BPP was 8/8 today - discussed could do NST today to ensure appropriate variability and no decelerations. Initial BP elevated but second improved. Denies all PIH s/s. Discussed breech position - placenta is posterior, YAYA 14cm. Infant has been vertex prior so is unstable lie. Discussed that Tuesday was oblique and not in pelvis without fundal pressure. Discussed options - ECV, scheduled c/s. Patient would like to consider ECV if Dr Corey Cuevas is agreeable - will discuss with her and notify patient. Discussed ECV process, risks.       Encouraged to keep visits as scheduled and call for further concerns/questions

## 2022-02-24 NOTE — TELEPHONE ENCOUNTER
Sure we can set her up for version with possible  or IOL - I think those get coordinated with Tony Ramírez

## 2022-02-24 NOTE — PROGRESS NOTES
Linda Soriano is here at 36w4d for an NST due to decreased fetal movement    FHT;        Baseline Heart Rate:  130        Accelerations:  present       Long Term Variability:  moderate       Decelerations:  absent         Contraction frequency: Irritability      reactive    Plan: continue twice/wk monitoring

## 2022-02-24 NOTE — TELEPHONE ENCOUNTER
I have reached out to Warden Smith in [facility] to see the appropriate way to coordinate her procedure.

## 2022-02-25 LAB
CULTURE: NORMAL
SPECIMEN DESCRIPTION: NORMAL

## 2022-02-28 ENCOUNTER — TELEPHONE (OUTPATIENT)
Dept: OBGYN CLINIC | Age: 32
End: 2022-02-28

## 2022-02-28 ENCOUNTER — ROUTINE PRENATAL (OUTPATIENT)
Dept: OBGYN CLINIC | Age: 32
End: 2022-02-28

## 2022-02-28 ENCOUNTER — PROCEDURE VISIT (OUTPATIENT)
Dept: OBGYN CLINIC | Age: 32
End: 2022-02-28
Payer: COMMERCIAL

## 2022-02-28 VITALS — SYSTOLIC BLOOD PRESSURE: 112 MMHG | BODY MASS INDEX: 25.63 KG/M2 | WEIGHT: 154 LBS | DIASTOLIC BLOOD PRESSURE: 74 MMHG

## 2022-02-28 DIAGNOSIS — O98.513 COVID-19 AFFECTING PREGNANCY IN THIRD TRIMESTER: ICD-10-CM

## 2022-02-28 DIAGNOSIS — O98.513 COVID-19 AFFECTING PREGNANCY IN THIRD TRIMESTER: Primary | ICD-10-CM

## 2022-02-28 DIAGNOSIS — O09.93 HIGH-RISK PREGNANCY IN THIRD TRIMESTER: Primary | ICD-10-CM

## 2022-02-28 DIAGNOSIS — U07.1 COVID-19 AFFECTING PREGNANCY IN THIRD TRIMESTER: ICD-10-CM

## 2022-02-28 DIAGNOSIS — O09.93 HIGH-RISK PREGNANCY IN THIRD TRIMESTER: ICD-10-CM

## 2022-02-28 DIAGNOSIS — U07.1 COVID-19 AFFECTING PREGNANCY IN THIRD TRIMESTER: Primary | ICD-10-CM

## 2022-02-28 PROCEDURE — 59025 FETAL NON-STRESS TEST: CPT | Performed by: ADVANCED PRACTICE MIDWIFE

## 2022-02-28 PROCEDURE — 0502F SUBSEQUENT PRENATAL CARE: CPT | Performed by: ADVANCED PRACTICE MIDWIFE

## 2022-02-28 NOTE — TELEPHONE ENCOUNTER
Please call patient - let her know IOL is scheduled for 3/16 at 12:30pm arrival    If able we will move to morning - depends on if spot opens up    Thanks

## 2022-02-28 NOTE — PROGRESS NOTES
Hali Rodrigez is a 28 y.o. female 37w1d      The patient was seen and evaluated. There was positive fetal movements. No contractions or leakage of fluid. Signs and symptoms of labor were reviewed. The S/S of Pre-Eclampsia were reviewed with the patient in detail. She is to report any of these if they occur. She currently denies any of these. Infant now vertex - IOL scheduled, consent signed    The patient was instructed on fetal kick counts and is encouraged to complete every 8 hours. She was instructed that the baby should move at a minimum of ten times within one hour after a meal. The patient was instructed to lay down on her left side twenty minutes after eating and count movements for up to one hour with a target value of ten movements. She was instructed to notify the office if she did not make that target after two attempts or if after any attempt there was less than four movements. The patient reports that the fetal movement targets have been made Yes. Antibody titer every month  Growth q4w in third trimester  1/18 COVID + therefore wkly NST/BPP as positive in third trimester (s/s started 1/16)    Does pt have history of infant delivery before 37 weeks: No  Previous c/s: No  Prenatal testing: Desires Prequel, unsure about MSAFP  Ped: Dr Alan Horowitz  Blood type: A Neg  Rhogam: Done  Flu shot:   TDAP (27-36 wks): 1/31/2022  GBS: Negative  GTT: Normal  GC/CT:  30 week Growth:  MRSA: Denies hx      T-Dap Vaccine Completed (27-36 weeks): Yes    Allergies: Allergies as of 02/28/2022 - Fully Reviewed 02/28/2022   Allergen Reaction Noted    Ceclor [cefaclor]  11/09/2020         Group Beta Strep collection was completed. Yes  GBS Results:   Hospital Outpatient Visit on 02/22/2022   Component Date Value Ref Range Status    Antibody Screen 02/22/2022 POSITIVE   Final    Antibody ID 02/22/2022 Anti-D Present   Final    Specimen Description 02/22/2022 . VAGINA   Final    Culture 02/22/2022 NEGATIVE FOR GROUP B STREPTOCOCCI   Final    Antibody ID 2022 Anti-D Present   Final    Ab Titer 2022  1:1 AHG   Final       If not done prior in pregnancy or if had previous positive result in this pregnancy, GC/CT were collected. No.    The patient was counseled on the mandatory call ahead policy. She has been instructed to call the office at anytime prior to going into the hospital so the on-call physician may direct her to the appropriate facility for care. Exceptions were reviewed including but not limited to: Decreased fetal movement, vaginal Bleeding or hemorrhage, trauma, readily expectant delivery, or any instance where she feels 911 should be utilized. The patient was counseled on Labor & Delivery.  Testing:  NST today      Assessment:  1Rashid Walton is a 28 y.o. female  2.   3. 37w1d    Patient Active Problem List    Diagnosis Date Noted    COVID-19 affecting pregnancy in third trimester 2022        Diagnosis Orders   1. COVID-19 affecting pregnancy in third trimester     2. High-risk pregnancy in third trimester           Plan:  The patient will return to the office for her next visit in 1 weeks. See antepartum flow sheet.

## 2022-02-28 NOTE — PROGRESS NOTES
Mimi Bartholomew is here at 37w1d for an NST due to high risk pregnancy - positive antibody screen and covid in third trimester    FHT;        Baseline Heart Rate:  130        Accelerations:  present       Long Term Variability:  moderate       Decelerations:  absent         Contraction frequency: No regular uterine activity      reactive    Plan: continue twice/wk monitoring

## 2022-03-02 ENCOUNTER — TELEPHONE (OUTPATIENT)
Dept: OBGYN | Age: 32
End: 2022-03-02

## 2022-03-02 NOTE — TELEPHONE ENCOUNTER
Patient of Reunion, CNM 37.5 weeks with contractions and back discomfort started 40 mins ago. Denies discharge or leaking. Pt has questions where to go, when to go to hospital.     Informed to enter thru ED of [address]. Monitor contractions for frequency and intensity. GO to [address] L&D for evaluation of progression of Labor. Pt will monitor a little longer at home.   Waiting for her mom to arrive from [place] to watch her other child.  hamida/rn

## 2022-03-03 ENCOUNTER — ROUTINE PRENATAL (OUTPATIENT)
Dept: OBGYN CLINIC | Age: 32
End: 2022-03-03
Payer: COMMERCIAL

## 2022-03-03 VITALS — SYSTOLIC BLOOD PRESSURE: 122 MMHG | BODY MASS INDEX: 25.33 KG/M2 | DIASTOLIC BLOOD PRESSURE: 72 MMHG | WEIGHT: 152.2 LBS

## 2022-03-03 DIAGNOSIS — O47.9: Primary | ICD-10-CM

## 2022-03-03 PROCEDURE — 99213 OFFICE O/P EST LOW 20 MIN: CPT | Performed by: ADVANCED PRACTICE MIDWIFE

## 2022-03-03 NOTE — TELEPHONE ENCOUNTER
Please touch base with patient this morning - we can see her today after imaging if still having ctx and discomfort

## 2022-03-03 NOTE — PROGRESS NOTES
EOB    Reports last evening had reg ctx every 4-7 minutes for about an hour then spaced out every 10-15 minutes. Was able to sleep but overnight did have some that woke her from her sleep. Believes lost mucus plug. Denies bleeding, spotting. Still having GFM. Had BPP/growth today - reviewed - but desired cervical check    Reviewed s/s of active vs false/early labor. Cervix with minimal change from earlier in the week. Reassurance given. Keep visits as previously scheduled.

## 2022-03-07 ENCOUNTER — PROCEDURE VISIT (OUTPATIENT)
Dept: OBGYN CLINIC | Age: 32
End: 2022-03-07
Payer: COMMERCIAL

## 2022-03-07 ENCOUNTER — ROUTINE PRENATAL (OUTPATIENT)
Dept: OBGYN CLINIC | Age: 32
End: 2022-03-07

## 2022-03-07 ENCOUNTER — TELEPHONE (OUTPATIENT)
Dept: OBGYN CLINIC | Age: 32
End: 2022-03-07

## 2022-03-07 VITALS — BODY MASS INDEX: 25.19 KG/M2 | DIASTOLIC BLOOD PRESSURE: 90 MMHG | SYSTOLIC BLOOD PRESSURE: 122 MMHG | WEIGHT: 151.4 LBS

## 2022-03-07 VITALS — SYSTOLIC BLOOD PRESSURE: 122 MMHG | BODY MASS INDEX: 25.23 KG/M2 | WEIGHT: 151.6 LBS | DIASTOLIC BLOOD PRESSURE: 82 MMHG

## 2022-03-07 DIAGNOSIS — U07.1 COVID-19 AFFECTING PREGNANCY IN THIRD TRIMESTER: ICD-10-CM

## 2022-03-07 DIAGNOSIS — Z3A.38 38 WEEKS GESTATION OF PREGNANCY: ICD-10-CM

## 2022-03-07 DIAGNOSIS — O98.513 COVID-19 AFFECTING PREGNANCY IN THIRD TRIMESTER: ICD-10-CM

## 2022-03-07 DIAGNOSIS — O09.93 HIGH-RISK PREGNANCY IN THIRD TRIMESTER: Primary | ICD-10-CM

## 2022-03-07 DIAGNOSIS — U07.1 COVID-19 AFFECTING PREGNANCY IN THIRD TRIMESTER: Primary | ICD-10-CM

## 2022-03-07 DIAGNOSIS — O98.513 COVID-19 AFFECTING PREGNANCY IN THIRD TRIMESTER: Primary | ICD-10-CM

## 2022-03-07 PROCEDURE — 0502F SUBSEQUENT PRENATAL CARE: CPT | Performed by: ADVANCED PRACTICE MIDWIFE

## 2022-03-07 PROCEDURE — 59025 FETAL NON-STRESS TEST: CPT | Performed by: ADVANCED PRACTICE MIDWIFE

## 2022-03-07 NOTE — PROGRESS NOTES
Josemanuel Gibson is a 28 y.o. female 43w4d      The patient was seen and evaluated. There was positive fetal movements. No contractions or leakage of fluid. Signs and symptoms of labor were reviewed. The S/S of Pre-Eclampsia were reviewed with the patient in detail. She is to report any of these if they occur. She currently denies any of these. Irreg ctx patterns over the last few days but fizzle out. NST reactive today. Desires membrane sweep with cervical exam - discussed process, side effects, concerns to watch for. Discussed breast pump nipple stim as patient inquired - discussed 10 minutes on, 10 minutes off for no more than 1 hour    The patient was instructed on fetal kick counts and is encouraged to complete every 8 hours. She was instructed that the baby should move at a minimum of ten times within one hour after a meal. The patient was instructed to lay down on her left side twenty minutes after eating and count movements for up to one hour with a target value of ten movements. She was instructed to notify the office if she did not make that target after two attempts or if after any attempt there was less than four movements. The patient reports that the fetal movement targets have been made Yes. IOL 3/16 at 12:30  Antibody titer every month  Growth q4w in third trimester  1/18 COVID + therefore wkly NST/BPP as positive in third trimester (s/s started 1/16)    Does pt have history of infant delivery before 37 weeks: No  Previous c/s: No  Prenatal testing: Desires Prequel, unsure about MSAFP  Ped: Dr Irais José  Blood type: A Neg  Rhogam: Done  Flu shot:   TDAP (27-36 wks): 1/31/2022  GBS: Negative  GTT: Normal  GC/CT:  30 week Growth:  MRSA: Denies hx      T-Dap Vaccine Completed (27-36 weeks): Yes    Allergies: Allergies as of 03/07/2022 - Fully Reviewed 03/07/2022   Allergen Reaction Noted    Ceclor [cefaclor]  11/09/2020         Group Beta Strep collection was completed.  Yes  GBS Results: Hospital Outpatient Visit on 2022   Component Date Value Ref Range Status    Antibody Screen 2022 POSITIVE   Final    Antibody ID 2022 Anti-D Present   Final    Specimen Description 2022 . VAGINA   Final    Culture 2022 NEGATIVE FOR GROUP B STREPTOCOCCI   Final    Antibody ID 2022 Anti-D Present   Final    Ab Titer 2022  1:1 AHG   Final       If not done prior in pregnancy or if had previous positive result in this pregnancy, GC/CT were collected. No.    The patient was counseled on the mandatory call ahead policy. She has been instructed to call the office at anytime prior to going into the hospital so the on-call physician may direct her to the appropriate facility for care. Exceptions were reviewed including but not limited to: Decreased fetal movement, vaginal Bleeding or hemorrhage, trauma, readily expectant delivery, or any instance where she feels 911 should be utilized. The patient was counseled on Labor & Delivery.  Testing:  NST reactive today      Assessment:  1Rashid Coleman is a 28 y.o. female  2.   3. 38w1d    Patient Active Problem List    Diagnosis Date Noted    COVID-19 affecting pregnancy in third trimester 2022        Diagnosis Orders   1. High-risk pregnancy in third trimester     2. COVID-19 affecting pregnancy in third trimester     3. 38 weeks gestation of pregnancy           Plan:  The patient will return to the office for her next visit in 1 weeks. See antepartum flow sheet.

## 2022-03-07 NOTE — TELEPHONE ENCOUNTER
Pt called at the end of the day wanting to come in and checked again. Pt stated after having her membranes stripped today she is having contractions. She also did the breast pump stimulations. Pt states the ctx around about 5 minutes apart. Pt was advised to go to L&D for evaluation. Pt states she is going to wait a little to see ctx gets closer then will go to L&D.

## 2022-03-07 NOTE — PROGRESS NOTES
Bentley Lozano is here at 38w1d for an NST due to positive antibody screen and third trimester COVID    FHT;        Baseline Heart Rate:  130        Accelerations:  present       Long Term Variability:  moderate       Decelerations:  absent         Contraction frequency: No regular pattern, irritability present and large amount of fetal movement      reactive    Plan: Continue wkly NSt/BPP - has visit today

## 2022-03-08 ENCOUNTER — ROUTINE PRENATAL (OUTPATIENT)
Dept: OBGYN CLINIC | Age: 32
End: 2022-03-08
Payer: COMMERCIAL

## 2022-03-08 ENCOUNTER — TELEPHONE (OUTPATIENT)
Dept: OBGYN CLINIC | Age: 32
End: 2022-03-08

## 2022-03-08 VITALS — BODY MASS INDEX: 25.13 KG/M2 | DIASTOLIC BLOOD PRESSURE: 80 MMHG | WEIGHT: 151 LBS | SYSTOLIC BLOOD PRESSURE: 112 MMHG

## 2022-03-08 DIAGNOSIS — Z3A.38 38 WEEKS GESTATION OF PREGNANCY: ICD-10-CM

## 2022-03-08 DIAGNOSIS — O47.1 FALSE LABOR AFTER 37 COMPLETED WEEKS OF GESTATION: Primary | ICD-10-CM

## 2022-03-08 PROCEDURE — 59025 FETAL NON-STRESS TEST: CPT | Performed by: ADVANCED PRACTICE MIDWIFE

## 2022-03-08 PROCEDURE — 99213 OFFICE O/P EST LOW 20 MIN: CPT | Performed by: ADVANCED PRACTICE MIDWIFE

## 2022-03-08 NOTE — PROGRESS NOTES
EOB    Reports since visit yesterday has had more periods of ctx. Woke at 0200 with ctx, showered from discomfort at 0400. Reports periods of 5-7 minutes but currently has not been tracking them. Denies LOF, still GFM, denies bleeding. Would like cervical exam to see if any change. Will also do NSt to evaluate for uterine activity. With palpation of abd ctx do feel moderate to strong while in room. Cervix similar to yesterday stretchy 4cm. No blood on glove after exam, no LOF. Discussed possibly latent labor and could be great deal of time prior to becoming more active. Discussed s/s of labor, call office for changes/cocnerns.   Recommended hydration, tylenol, monitor and if becomes more uncomfortable or other changes then go to hospital.  Discussed if goes to hospital at this time will monitor for period of time to see if cervical change but if no change then will d/c home as <39 weeks therefore cannot augment labor at this time

## 2022-03-08 NOTE — PROGRESS NOTES
Patient had called office with ctx again and more discomfort - offered her to go to hospital for monitoring for period of time but if no cervical change then would be d/c home. Had explained to patient prior that if not 39+ weeks and not in active labor that cannot augment or induce without a medical indication. Patient was/is understanding of this. Prior office visit had reviewed coping measures and comfort measures for home to monitor while possibly in early labor. Reports at home has had some ctx which are hard to breath/talk through and bent over counter. Denies LOF. Having decreased appetite since this morning. Ctx have been every 1-5 minutes per her tracking. Abd palpated when having ctx in the room and moderate to strong with palpation. Cervix unchanged from morning - stretchy 4cm. Posterior position but does move to anterior with check. Encouraged to continue coping measures and go to hospital with ctx every 2-3 minutes regularly or if LOF. Discussed option to go to hospital now and can offer maternal rest for period of time to see if makes change - declined currently. Patient will call with changes.

## 2022-03-08 NOTE — TELEPHONE ENCOUNTER
Pt calling stating she was in this morning with ctx and has been consistently getting them every 2- 3-5 minutes. Reports some are strong and take her breath away. Pt unsure if she should go to hospital because she doesn't want to just be sent home. Spoke with CONNIE Gee and discussed going to hospital vs rechecking cervix in the office. Pt would like to return to office and have cervix rechecked.

## 2022-03-10 ENCOUNTER — ANESTHESIA (OUTPATIENT)
Dept: LABOR AND DELIVERY | Age: 32
End: 2022-03-10
Payer: COMMERCIAL

## 2022-03-10 ENCOUNTER — HOSPITAL ENCOUNTER (INPATIENT)
Age: 32
LOS: 2 days | Discharge: HOME | End: 2022-03-12
Attending: ADVANCED PRACTICE MIDWIFE | Admitting: ADVANCED PRACTICE MIDWIFE
Payer: COMMERCIAL

## 2022-03-10 ENCOUNTER — ROUTINE PRENATAL (OUTPATIENT)
Dept: OBGYN CLINIC | Age: 32
End: 2022-03-10

## 2022-03-10 ENCOUNTER — ANESTHESIA EVENT (OUTPATIENT)
Dept: LABOR AND DELIVERY | Age: 32
End: 2022-03-10
Payer: COMMERCIAL

## 2022-03-10 VITALS — SYSTOLIC BLOOD PRESSURE: 130 MMHG | WEIGHT: 151.2 LBS | DIASTOLIC BLOOD PRESSURE: 90 MMHG | BODY MASS INDEX: 25.16 KG/M2

## 2022-03-10 DIAGNOSIS — O16.3 ELEVATED BLOOD PRESSURE COMPLICATING PREGNANCY, ANTEPARTUM, THIRD TRIMESTER: Primary | ICD-10-CM

## 2022-03-10 PROBLEM — Z34.90 TERM PREGNANCY: Status: ACTIVE | Noted: 2022-03-10

## 2022-03-10 LAB
ABSOLUTE EOS #: <0.03 K/UL (ref 0–0.44)
ABSOLUTE IMMATURE GRANULOCYTE: 0.04 K/UL (ref 0–0.3)
ABSOLUTE LYMPH #: 1.89 K/UL (ref 1.1–3.7)
ABSOLUTE MONO #: 0.49 K/UL (ref 0.1–1.2)
ALBUMIN SERPL-MCNC: 3.4 G/DL (ref 3.5–5.2)
ALBUMIN/GLOBULIN RATIO: 0.9 (ref 1–2.5)
ALP BLD-CCNC: 287 U/L (ref 35–104)
ALT SERPL-CCNC: 100 U/L (ref 5–33)
AMPHETAMINE SCREEN URINE: NEGATIVE
ANION GAP SERPL CALCULATED.3IONS-SCNC: 12 MMOL/L (ref 9–17)
AST SERPL-CCNC: 58 U/L
BARBITURATE SCREEN URINE: NEGATIVE
BASOPHILS # BLD: 1 % (ref 0–2)
BASOPHILS ABSOLUTE: 0.05 K/UL (ref 0–0.2)
BENZODIAZEPINE SCREEN, URINE: NEGATIVE
BILIRUB SERPL-MCNC: 0.81 MG/DL (ref 0.3–1.2)
BUN BLDV-MCNC: 21 MG/DL (ref 6–20)
BUN/CREAT BLD: 19 (ref 9–20)
BUPRENORPHINE URINE: NEGATIVE
CALCIUM SERPL-MCNC: 9.1 MG/DL (ref 8.6–10.4)
CANNABINOID SCREEN URINE: NEGATIVE
CHLORIDE BLD-SCNC: 90 MMOL/L (ref 98–107)
CO2: 22 MMOL/L (ref 20–31)
COCAINE METABOLITE, URINE: NEGATIVE
CREAT SERPL-MCNC: 1.13 MG/DL (ref 0.5–0.9)
CREATININE URINE: 46 MG/DL (ref 28–217)
EOSINOPHILS RELATIVE PERCENT: 0 % (ref 1–4)
GFR AFRICAN AMERICAN: >60 ML/MIN
GFR NON-AFRICAN AMERICAN: 57 ML/MIN
GFR SERPL CREATININE-BSD FRML MDRD: ABNORMAL ML/MIN/{1.73_M2}
GFR SERPL CREATININE-BSD FRML MDRD: ABNORMAL ML/MIN/{1.73_M2}
GLUCOSE BLD-MCNC: 75 MG/DL (ref 70–99)
HCT VFR BLD CALC: 38.1 % (ref 36.3–47.1)
HEMOGLOBIN: 13 G/DL (ref 11.9–15.1)
IMMATURE GRANULOCYTES: 0 %
LYMPHOCYTES # BLD: 21 % (ref 24–43)
MCH RBC QN AUTO: 30.7 PG (ref 25.2–33.5)
MCHC RBC AUTO-ENTMCNC: 34.1 G/DL (ref 28.4–34.8)
MCV RBC AUTO: 90.1 FL (ref 82.6–102.9)
METHADONE SCREEN, URINE: NEGATIVE
METHAMPHETAMINE, URINE: NEGATIVE
MONOCYTES # BLD: 6 % (ref 3–12)
NRBC AUTOMATED: 0 PER 100 WBC
OPIATES, URINE: NEGATIVE
OXYCODONE SCREEN URINE: NEGATIVE
PDW BLD-RTO: 12.2 % (ref 11.8–14.4)
PHENCYCLIDINE, URINE: NEGATIVE
PLATELET # BLD: 177 K/UL (ref 138–453)
PMV BLD AUTO: 11.6 FL (ref 8.1–13.5)
POTASSIUM SERPL-SCNC: 4 MMOL/L (ref 3.7–5.3)
PROPOXYPHENE, URINE: NEGATIVE
RBC # BLD: 4.23 M/UL (ref 3.95–5.11)
SEG NEUTROPHILS: 72 % (ref 36–65)
SEGMENTED NEUTROPHILS ABSOLUTE COUNT: 6.41 K/UL (ref 1.5–8.1)
SODIUM BLD-SCNC: 124 MMOL/L (ref 135–144)
TOTAL PROTEIN, URINE: 5 MG/DL
TOTAL PROTEIN: 7 G/DL (ref 6.4–8.3)
TRICYCLIC ANTIDEPRESSANTS, UR: NEGATIVE
URIC ACID: 7 MG/DL (ref 2.4–5.7)
URINE TOTAL PROTEIN CREATININE RATIO: 0.11 (ref 0–0.2)
WBC # BLD: 8.9 K/UL (ref 3.5–11.3)

## 2022-03-10 PROCEDURE — 1220000000 HC SEMI PRIVATE OB R&B

## 2022-03-10 PROCEDURE — 10907ZC DRAINAGE OF AMNIOTIC FLUID, THERAPEUTIC FROM PRODUCTS OF CONCEPTION, VIA NATURAL OR ARTIFICIAL OPENING: ICD-10-PCS | Performed by: ADVANCED PRACTICE MIDWIFE

## 2022-03-10 PROCEDURE — 36415 COLL VENOUS BLD VENIPUNCTURE: CPT

## 2022-03-10 PROCEDURE — 80306 DRUG TEST PRSMV INSTRMNT: CPT

## 2022-03-10 PROCEDURE — 3E033VJ INTRODUCTION OF OTHER HORMONE INTO PERIPHERAL VEIN, PERCUTANEOUS APPROACH: ICD-10-PCS | Performed by: ADVANCED PRACTICE MIDWIFE

## 2022-03-10 PROCEDURE — 2500000003 HC RX 250 WO HCPCS: Performed by: NURSE ANESTHETIST, CERTIFIED REGISTERED

## 2022-03-10 PROCEDURE — 7200000001 HC VAGINAL DELIVERY

## 2022-03-10 PROCEDURE — 85025 COMPLETE CBC W/AUTO DIFF WBC: CPT

## 2022-03-10 PROCEDURE — 3700000025 EPIDURAL BLOCK: Performed by: NURSE ANESTHETIST, CERTIFIED REGISTERED

## 2022-03-10 PROCEDURE — 6360000002 HC RX W HCPCS: Performed by: ADVANCED PRACTICE MIDWIFE

## 2022-03-10 PROCEDURE — 2580000003 HC RX 258: Performed by: ADVANCED PRACTICE MIDWIFE

## 2022-03-10 PROCEDURE — 80053 COMPREHEN METABOLIC PANEL: CPT

## 2022-03-10 PROCEDURE — 51701 INSERT BLADDER CATHETER: CPT

## 2022-03-10 PROCEDURE — 84156 ASSAY OF PROTEIN URINE: CPT

## 2022-03-10 PROCEDURE — 0502F SUBSEQUENT PRENATAL CARE: CPT | Performed by: ADVANCED PRACTICE MIDWIFE

## 2022-03-10 PROCEDURE — 88307 TISSUE EXAM BY PATHOLOGIST: CPT

## 2022-03-10 PROCEDURE — 82570 ASSAY OF URINE CREATININE: CPT

## 2022-03-10 PROCEDURE — 0HQ9XZZ REPAIR PERINEUM SKIN, EXTERNAL APPROACH: ICD-10-PCS | Performed by: ADVANCED PRACTICE MIDWIFE

## 2022-03-10 PROCEDURE — 59409 OBSTETRICAL CARE: CPT | Performed by: ADVANCED PRACTICE MIDWIFE

## 2022-03-10 PROCEDURE — 84550 ASSAY OF BLOOD/URIC ACID: CPT

## 2022-03-10 PROCEDURE — 6360000002 HC RX W HCPCS: Performed by: NURSE ANESTHETIST, CERTIFIED REGISTERED

## 2022-03-10 RX ORDER — SODIUM CHLORIDE 0.9 % (FLUSH) 0.9 %
5-40 SYRINGE (ML) INJECTION PRN
Status: DISCONTINUED | OUTPATIENT
Start: 2022-03-10 | End: 2022-03-10

## 2022-03-10 RX ORDER — SEVOFLURANE 250 ML/250ML
1 LIQUID RESPIRATORY (INHALATION) CONTINUOUS PRN
Status: DISCONTINUED | OUTPATIENT
Start: 2022-03-10 | End: 2022-03-10

## 2022-03-10 RX ORDER — METHYLERGONOVINE MALEATE 0.2 MG/ML
200 INJECTION INTRAVENOUS PRN
Status: DISCONTINUED | OUTPATIENT
Start: 2022-03-10 | End: 2022-03-10

## 2022-03-10 RX ORDER — CARBOPROST TROMETHAMINE 250 UG/ML
250 INJECTION, SOLUTION INTRAMUSCULAR PRN
Status: DISCONTINUED | OUTPATIENT
Start: 2022-03-10 | End: 2022-03-10

## 2022-03-10 RX ORDER — SODIUM CHLORIDE 0.9 % (FLUSH) 0.9 %
10 SYRINGE (ML) INJECTION EVERY 12 HOURS SCHEDULED
Status: DISCONTINUED | OUTPATIENT
Start: 2022-03-10 | End: 2022-03-12 | Stop reason: HOSPADM

## 2022-03-10 RX ORDER — SODIUM CHLORIDE 9 MG/ML
25 INJECTION, SOLUTION INTRAVENOUS PRN
Status: DISCONTINUED | OUTPATIENT
Start: 2022-03-10 | End: 2022-03-11

## 2022-03-10 RX ORDER — DOCUSATE SODIUM 100 MG/1
100 CAPSULE, LIQUID FILLED ORAL 2 TIMES DAILY PRN
Status: DISCONTINUED | OUTPATIENT
Start: 2022-03-10 | End: 2022-03-12 | Stop reason: HOSPADM

## 2022-03-10 RX ORDER — NALBUPHINE HCL 10 MG/ML
10 AMPUL (ML) INJECTION
Status: DISCONTINUED | OUTPATIENT
Start: 2022-03-10 | End: 2022-03-10

## 2022-03-10 RX ORDER — IBUPROFEN 800 MG/1
800 TABLET ORAL EVERY 8 HOURS
Status: DISCONTINUED | OUTPATIENT
Start: 2022-03-10 | End: 2022-03-12 | Stop reason: HOSPADM

## 2022-03-10 RX ORDER — SODIUM CHLORIDE, SODIUM LACTATE, POTASSIUM CHLORIDE, CALCIUM CHLORIDE 600; 310; 30; 20 MG/100ML; MG/100ML; MG/100ML; MG/100ML
INJECTION, SOLUTION INTRAVENOUS CONTINUOUS
Status: DISCONTINUED | OUTPATIENT
Start: 2022-03-10 | End: 2022-03-10

## 2022-03-10 RX ORDER — ROPIVACAINE HYDROCHLORIDE 2 MG/ML
10 INJECTION, SOLUTION EPIDURAL; INFILTRATION; PERINEURAL CONTINUOUS
Status: DISCONTINUED | OUTPATIENT
Start: 2022-03-10 | End: 2022-03-12 | Stop reason: HOSPADM

## 2022-03-10 RX ORDER — SODIUM CHLORIDE 0.9 % (FLUSH) 0.9 %
10 SYRINGE (ML) INJECTION PRN
Status: DISCONTINUED | OUTPATIENT
Start: 2022-03-10 | End: 2022-03-12 | Stop reason: HOSPADM

## 2022-03-10 RX ORDER — MODIFIED LANOLIN
OINTMENT (GRAM) TOPICAL PRN
Status: DISCONTINUED | OUTPATIENT
Start: 2022-03-10 | End: 2022-03-12 | Stop reason: HOSPADM

## 2022-03-10 RX ORDER — ROPIVACAINE HYDROCHLORIDE 2 MG/ML
INJECTION, SOLUTION EPIDURAL; INFILTRATION; PERINEURAL PRN
Status: DISCONTINUED | OUTPATIENT
Start: 2022-03-10 | End: 2022-03-10 | Stop reason: SDUPTHER

## 2022-03-10 RX ORDER — SODIUM CHLORIDE 0.9 % (FLUSH) 0.9 %
5-40 SYRINGE (ML) INJECTION EVERY 12 HOURS SCHEDULED
Status: DISCONTINUED | OUTPATIENT
Start: 2022-03-10 | End: 2022-03-10

## 2022-03-10 RX ORDER — LIDOCAINE HYDROCHLORIDE 10 MG/ML
30 INJECTION, SOLUTION EPIDURAL; INFILTRATION; INTRACAUDAL; PERINEURAL PRN
Status: DISCONTINUED | OUTPATIENT
Start: 2022-03-10 | End: 2022-03-10

## 2022-03-10 RX ORDER — ACETAMINOPHEN 325 MG/1
650 TABLET ORAL EVERY 4 HOURS PRN
Status: DISCONTINUED | OUTPATIENT
Start: 2022-03-10 | End: 2022-03-12 | Stop reason: HOSPADM

## 2022-03-10 RX ORDER — MISOPROSTOL 100 UG/1
900 TABLET ORAL PRN
Status: DISCONTINUED | OUTPATIENT
Start: 2022-03-10 | End: 2022-03-10

## 2022-03-10 RX ORDER — SODIUM CHLORIDE 9 MG/ML
25 INJECTION, SOLUTION INTRAVENOUS PRN
Status: DISCONTINUED | OUTPATIENT
Start: 2022-03-10 | End: 2022-03-10

## 2022-03-10 RX ORDER — SODIUM CHLORIDE, SODIUM LACTATE, POTASSIUM CHLORIDE, CALCIUM CHLORIDE 600; 310; 30; 20 MG/100ML; MG/100ML; MG/100ML; MG/100ML
500 INJECTION, SOLUTION INTRAVENOUS PRN
Status: DISCONTINUED | OUTPATIENT
Start: 2022-03-10 | End: 2022-03-10

## 2022-03-10 RX ORDER — ONDANSETRON 2 MG/ML
4 INJECTION INTRAMUSCULAR; INTRAVENOUS EVERY 6 HOURS PRN
Status: DISCONTINUED | OUTPATIENT
Start: 2022-03-10 | End: 2022-03-10

## 2022-03-10 RX ORDER — SODIUM CHLORIDE, SODIUM LACTATE, POTASSIUM CHLORIDE, CALCIUM CHLORIDE 600; 310; 30; 20 MG/100ML; MG/100ML; MG/100ML; MG/100ML
1000 INJECTION, SOLUTION INTRAVENOUS PRN
Status: DISCONTINUED | OUTPATIENT
Start: 2022-03-10 | End: 2022-03-10

## 2022-03-10 RX ORDER — EPHEDRINE SULFATE/0.9% NACL/PF 50 MG/5 ML
5 SYRINGE (ML) INTRAVENOUS PRN
Status: DISCONTINUED | OUTPATIENT
Start: 2022-03-10 | End: 2022-03-12 | Stop reason: HOSPADM

## 2022-03-10 RX ORDER — ACETAMINOPHEN 325 MG/1
650 TABLET ORAL EVERY 4 HOURS PRN
Status: DISCONTINUED | OUTPATIENT
Start: 2022-03-10 | End: 2022-03-10

## 2022-03-10 RX ORDER — LIDOCAINE HYDROCHLORIDE 20 MG/ML
INJECTION, SOLUTION EPIDURAL; INFILTRATION; INTRACAUDAL; PERINEURAL PRN
Status: DISCONTINUED | OUTPATIENT
Start: 2022-03-10 | End: 2022-03-10 | Stop reason: SDUPTHER

## 2022-03-10 RX ADMIN — SODIUM CHLORIDE, POTASSIUM CHLORIDE, SODIUM LACTATE AND CALCIUM CHLORIDE: 600; 310; 30; 20 INJECTION, SOLUTION INTRAVENOUS at 19:19

## 2022-03-10 RX ADMIN — ROPIVACAINE HYDROCHLORIDE 10 ML/HR: 2 INJECTION, SOLUTION EPIDURAL; INFILTRATION at 17:24

## 2022-03-10 RX ADMIN — ROPIVACAINE HYDROCHLORIDE 5 ML: 2 INJECTION, SOLUTION EPIDURAL; INFILTRATION at 18:53

## 2022-03-10 RX ADMIN — SODIUM CHLORIDE, POTASSIUM CHLORIDE, SODIUM LACTATE AND CALCIUM CHLORIDE: 600; 310; 30; 20 INJECTION, SOLUTION INTRAVENOUS at 14:18

## 2022-03-10 RX ADMIN — LIDOCAINE HYDROCHLORIDE 5 ML: 20 INJECTION, SOLUTION EPIDURAL; INFILTRATION; INTRACAUDAL; PERINEURAL at 18:52

## 2022-03-10 RX ADMIN — ROPIVACAINE HYDROCHLORIDE 5 ML: 2 INJECTION, SOLUTION EPIDURAL; INFILTRATION at 17:21

## 2022-03-10 RX ADMIN — ONDANSETRON 4 MG: 2 INJECTION INTRAMUSCULAR; INTRAVENOUS at 19:39

## 2022-03-10 RX ADMIN — Medication 1 MILLI-UNITS/MIN: at 14:24

## 2022-03-10 NOTE — FLOWSHEET NOTE
Pt reporting UCs more painful on her right side than the left despite being turned to the right and pushing her PCEA button; CRNA notified and states he will come in

## 2022-03-10 NOTE — ANESTHESIA PRE PROCEDURE
Department of Anesthesiology  Preprocedure Note       Name:  Benjamin Hammond   Age:  28 y.o.  :  1990                                          MRN:  [id number]         Date:  3/10/2022      Surgeon: * No surgeons listed *    Procedure: * No procedures listed *    Medications prior to admission:   Prior to Admission medications    Medication Sig Start Date End Date Taking?  Authorizing Provider   Prenatal Vit-Fe Fumarate-FA (PRENATAL VITAMIN PO) Take by mouth   Yes Historical Provider, MD   Omega-3 Fatty Acids (FISH OIL PO) Take by mouth   Yes Historical Provider, MD   Ascorbic Acid (VITAMIN C ER PO) Take by mouth    Yes Historical Provider, MD   Cholecalciferol (VITAMIN D-3 PO) Take 1 tablet by mouth daily    Historical Provider, MD       Current medications:    Current Facility-Administered Medications   Medication Dose Route Frequency Provider Last Rate Last Admin    oxytocin (PITOCIN) 30 units in 500 mL infusion  1 shabana-units/min IntraVENous Continuous Shelvy Fender, APRN - CNM 8 mL/hr at 03/10/22 1630 8 shabana-units/min at 03/10/22 1630    lactated ringers infusion   IntraVENous Continuous Maribethvy Fender, APRN -  mL/hr at 03/10/22 1418 New Bag at 03/10/22 1418    lactated ringers infusion 500 mL  500 mL IntraVENous PRN Jeni C Gerard, APRN - CNM        Or    lactated ringers infusion 1,000 mL  1,000 mL IntraVENous PRN Jeni C Gerard, APRN - CNM        sodium chloride flush 0.9 % injection 5-40 mL  5-40 mL IntraVENous 2 times per day Jeni C Gerard, APRN - CNM        sodium chloride flush 0.9 % injection 5-40 mL  5-40 mL IntraVENous PRN Jeni C Gerard, APRN - CNM        0.9 % sodium chloride infusion  25 mL IntraVENous PRN Jeni C Gerard, APRN - CNM        lidocaine PF 1 % injection 30 mL  30 mL Other PRN Jeni C Gerard, APRN - CNM        nalbuphine (NUBAIN) injection 10 mg  10 mg IntraVENous Q2H PRN Jeni C Gerard, APRN - CNM        nitrous oxide 50% inhalation 1 each 1 each Inhalation Continuous PRN Jeni C Gerard, APRN - CNM        ondansetron (ZOFRAN) injection 4 mg  4 mg IntraVENous Q6H PRN Jeni C Gerard, APRN - CNM        oxytocin (PITOCIN) 30 units in 500 mL infusion  166 shabana-units/min IntraVENous PRN Jeni C Gerard, APRN - CNM        And    oxytocin (PITOCIN) 10 unit bolus from the bag  999 mL/hr IntraVENous PRN Jeni C Gerard, APRN - CNM        methylergonovine (METHERGINE) injection 200 mcg  200 mcg IntraMUSCular PRN Jeni C Gerard, APRN - CNM        carboprost (HEMABATE) injection 250 mcg  250 mcg IntraMUSCular PRN Jeni C Gerard, APRN - CNM        miSOPROStol (CYTOTEC) tablet 900 mcg  900 mcg Rectal PRN Jeni C Gerard, APRN - CNM        acetaminophen (TYLENOL) tablet 650 mg  650 mg Oral Q4H PRN Jeni Cori Line, APRN - CNM        witch hazel-glycerin (TUCKS) pad   Topical PRN Jeni C Gerard, APRN - CNM        benzocaine-menthol (DERMOPLAST) 20-0.5 % spray   Topical PRN Jeni Cori Line, APRN - CNM           Allergies: Allergies   Allergen Reactions    Ceclor [Cefaclor]        Problem List:    Patient Active Problem List   Diagnosis Code    FGOSA-75 affecting pregnancy in third trimester O98.513, U07.1    Term pregnancy Z34.90       Past Medical History:        Diagnosis Date    Heart abnormality     elevated blood pressure       Past Surgical History:  History reviewed. No pertinent surgical history.     Social History:    Social History     Tobacco Use    Smoking status: Never Smoker    Smokeless tobacco: Never Used   Substance Use Topics    Alcohol use: Not Currently                                Counseling given: Not Answered      Vital Signs (Current):   Vitals:    03/10/22 1438 03/10/22 1508 03/10/22 1539 03/10/22 1608   BP: 120/80 124/75 132/82 131/80   Pulse: 63 56 58 62   Resp:       Temp:       TempSrc:       Weight:       Height:                                                  BP Readings from Last 3 Encounters:   03/10/22 131/80   03/10/22 (!) 130/90   03/08/22 112/80       NPO Status:                                                                                 BMI:   Wt Readings from Last 3 Encounters:   03/10/22 151 lb (68.5 kg)   03/10/22 151 lb 3.2 oz (68.6 kg)   03/08/22 151 lb (68.5 kg)     Body mass index is 25.13 kg/m². CBC:   Lab Results   Component Value Date    WBC 8.9 03/10/2022    RBC 4.23 03/10/2022    HGB 13.0 03/10/2022    HCT 38.1 03/10/2022    MCV 90.1 03/10/2022    RDW 12.2 03/10/2022     03/10/2022       CMP:   Lab Results   Component Value Date     03/10/2022    K 4.0 03/10/2022    CL 90 03/10/2022    CO2 22 03/10/2022    BUN 21 03/10/2022    CREATININE 1.13 03/10/2022    GFRAA >60 03/10/2022    LABGLOM 57 03/10/2022    GLUCOSE 75 03/10/2022    PROT 7.0 03/10/2022    CALCIUM 9.1 03/10/2022    BILITOT 0.81 03/10/2022    ALKPHOS 287 03/10/2022    AST 58 03/10/2022     03/10/2022       POC Tests: No results for input(s): POCGLU, POCNA, POCK, POCCL, POCBUN, POCHEMO, POCHCT in the last 72 hours.     Coags: No results found for: PROTIME, INR, APTT    HCG (If Applicable): No results found for: PREGTESTUR, PREGSERUM, HCG, HCGQUANT     ABGs: No results found for: PHART, PO2ART, FSG3EOA, NKS4BBH, BEART, V7SSZRNW     Type & Screen (If Applicable):  No results found for: LABABO, LABRH    Drug/Infectious Status (If Applicable):  Lab Results   Component Value Date    HEPCAB NONREACTIVE 09/01/2021       COVID-19 Screening (If Applicable): No results found for: COVID19        Anesthesia Evaluation  Patient summary reviewed and Nursing notes reviewed  Airway: Mallampati: I  TM distance: >3 FB   Neck ROM: full  Mouth opening: > = 3 FB Dental: normal exam         Pulmonary:normal exam                              ROS comment: 1/2022 covid 19, no residual     Cardiovascular:Negative CV ROS  Exercise tolerance: good (>4 METS),            Beta Blocker:  Not on Beta Blocker Neuro/Psych:   Negative Neuro/Psych ROS              GI/Hepatic/Renal: Neg GI/Hepatic/Renal ROS            Endo/Other: Negative Endo/Other ROS                    Abdominal:             Vascular: negative vascular ROS. Other Findings:             Anesthesia Plan      epidural     ASA 2           MIPS: Prophylactic antiemetics administered. Anesthetic plan and risks discussed with patient. Use of blood products discussed with patient whom consented to blood products.                    MAEVE De La Cruz - MICHAEL   3/10/2022

## 2022-03-10 NOTE — PROGRESS NOTES
Yuridia Bartholomew is a 28 y.o. female 38w3d      The patient was seen and evaluated. There was positive fetal movements. No contractions or leakage of fluid. Signs and symptoms of labor were reviewed. The S/S of Pre-Eclampsia were reviewed with the patient in detail. She is to report any of these if they occur. She currently denies any of these. Did have headache yesterday, denies vision changes. Today having RUQ and epigastric discomfort. No protein on dipstick but BP elevated (diastolic) - has had two prior elevated BP but repeat were improved. The patient was instructed on fetal kick counts and is encouraged to complete every 8 hours. She was instructed that the baby should move at a minimum of ten times within one hour after a meal. The patient was instructed to lay down on her left side twenty minutes after eating and count movements for up to one hour with a target value of ten movements. She was instructed to notify the office if she did not make that target after two attempts or if after any attempt there was less than four movements. The patient reports that the fetal movement targets have been made Yes. IOL 3/16 at 12:30  Antibody titer every month  Growth q4w in third trimester  1/18 COVID + therefore wkly NST/BPP as positive in third trimester (s/s started 1/16)    Does pt have history of infant delivery before 37 weeks: No  Previous c/s: No  Prenatal testing: Desires Prequel, unsure about MSAFP  Ped: Dr [place]  Blood type: A Neg  Rhogam: Done  Flu shot:   TDAP (27-36 wks): 1/31/2022  GBS: Negative  GTT: Normal  GC/CT:  30 week Growth:  MRSA: Denies hx      T-Dap Vaccine Completed (27-36 weeks): Yes    Allergies: Allergies as of 03/10/2022 - Fully Reviewed 03/10/2022   Allergen Reaction Noted    Ceclor [cefaclor]  11/09/2020         Group Beta Strep collection was completed.  Yes  GBS Results:   Hospital Outpatient Visit on 02/22/2022   Component Date Value Ref Range Status    Antibody Screen 2022 POSITIVE   Final    Antibody ID 2022 Anti-D Present   Final    Specimen Description 2022 . VAGINA   Final    Culture 2022 NEGATIVE FOR GROUP B STREPTOCOCCI   Final    Antibody ID 2022 Anti-D Present   Final    Ab Titer 2022  1:1 AHG   Final       If not done prior in pregnancy or if had previous positive result in this pregnancy, GC/CT were collected. No.    The patient was counseled on the mandatory call ahead policy. She has been instructed to call the office at anytime prior to going into the hospital so the on-call physician may direct her to the appropriate facility for care. Exceptions were reviewed including but not limited to: Decreased fetal movement, vaginal Bleeding or hemorrhage, trauma, readily expectant delivery, or any instance where she feels 911 should be utilized. The patient was counseled on Labor & Delivery.  Testing:  BPP  today  YAYA 13.8cm      Assessment:  1Rashid Jackson is a 28 y.o. female  2.   3. 38w4d    Patient Active Problem List    Diagnosis Date Noted    COVID-19 affecting pregnancy in third trimester 2022        Diagnosis Orders   1. Elevated blood pressure complicating pregnancy, antepartum, third trimester           Plan:  Considering term gestation, headache yesterday, RUQ and epigastric pain today, elevated BP - to OB for IOL - Carol Ruth RN notified.   Dr Yosef Felder also updated

## 2022-03-10 NOTE — ANESTHESIA PROCEDURE NOTES
Epidural Block    Patient location during procedure: OB  Start time: 3/10/2022 5:04 PM  End time: 3/10/2022 5:20 PM  Reason for block: labor epidural  Staffing  Performed: resident/CRNA   Resident/CRNA: MAEVE Juarez CRNA  Preanesthetic Checklist  Completed: patient identified, IV checked, site marked, risks and benefits discussed, monitors and equipment checked, pre-op evaluation, timeout performed, anesthesia consent given, oxygen available and patient being monitored  Epidural  Patient position: sitting  Prep: ChloraPrep  Patient monitoring: cardiac monitor, continuous pulse ox and frequent blood pressure checks  Approach: midline  Location: lumbar (1-5) (L4-5)  Injection technique: SUAD saline  Guidance: Anatomy guided  Provider prep: sterile gloves and mask  Needle  Needle type: Tuohy   Needle gauge: 17 G  Needle length: 3.5 in  Needle insertion depth: 6.5 cm  Catheter type: side hole  Catheter size: 19 G  Catheter at skin depth: 15 cm  Test dose: negative  Assessment  Sensory level: T4  Hemodynamics: stable  Attempts: 1  Additional Notes  3ml 1.5% lidocaine HCL & 1:200,000 Epinephrine Test Dose administered at: 1353

## 2022-03-10 NOTE — FLOWSHEET NOTE
Epidural catheter adjusted and re-taped; additional bolus given as well; pt remains in rt lateral position

## 2022-03-10 NOTE — FLOWSHEET NOTE
Pt stated her contractions are feeling less intense and she rates her pain 5/10 now; epidural in place

## 2022-03-10 NOTE — H&P
Department of Obstetrics and Gynecology   Obstetrics History and Physical  Anat Brand C.N.M  H&P Admission Inpatient  Note        CHIEF COMPLAINT:  Sent over from office for IOL. Patient was seen today in office and found to have elevated diastolic /49. Reported headache yesterday which did resolve without medication and with some rest - no headache today. However, having some dull RUQ discomfort. Denied vision changes. HISTORY OF PRESENT ILLNESS:      The patient is a 28 y.o. female at 38w3d. OB History        2    Para   1    Term   1            AB        Living   1       SAB        IAB        Ectopic        Molar        Multiple        Live Births   1            Patient presents with a chief complaint as above and is being admitted for induction    Estimated Due Date: Estimated Date of Delivery: 3/20/22    PRENATAL CARE:    Complicated by: elevated BP today (previously had 122/90 with repeat of 122/82 at 38 1/7 weeks gestation; 140/80 with repeat of 128/86 at 36 4/7 weeks gestation). Also COVID in third trimester - mild case. In addition has had a positive antibody screen this pregnancy - Anti D with titers always <1:1. PAST OB HISTORY:  OB History        2    Para   1    Term   1            AB        Living   1       SAB        IAB        Ectopic        Molar        Multiple        Live Births   1                Past Medical History:        Diagnosis Date    Heart abnormality     elevated blood pressure     Past Surgical History:    History reviewed. No pertinent surgical history.   Allergies:  Ceclor [cefaclor]    Social History:    Social History     Socioeconomic History    Marital status:      Spouse name: Not on file    Number of children: Not on file    Years of education: Not on file    Highest education level: Not on file   Occupational History    Not on file   Tobacco Use    Smoking status: Never Smoker    Smokeless tobacco: Never Used Prenatal Vit-Fe Fumarate-FA (PRENATAL VITAMIN PO), Take by mouth  Omega-3 Fatty Acids (FISH OIL PO), Take by mouth  Ascorbic Acid (VITAMIN C ER PO), Take by mouth   Cholecalciferol (VITAMIN D-3 PO), Take 1 tablet by mouth daily    REVIEW OF SYSTEMS:    CONSTITUTIONAL:  negative  RESPIRATORY:  negative  CARDIOVASCULAR:  negative  GASTROINTESTINAL:  RUQ abd pain - described as dull. Pain with ctx. ALLERGIC/IMMUNOLOGIC:  negative  NEUROLOGICAL:  negative  BEHAVIOR/PSYCH:  negative    PHYSICAL EXAM:  Vitals:    03/10/22 1805 03/10/22 1807 03/10/22 1810 03/10/22 1815   BP:  (!) 118/56     Pulse:  60     Resp:       Temp:       TempSrc:       SpO2: 100%  98% 99%   Weight:       Height:         General appearance:  awake, alert, cooperative, no apparent distress, and appears stated age  Neurologic:  Awake, alert, oriented to name, place and time. Bilateral lower extremity reflexes 2+ brisk. Lungs:  No increased work of breathing, good air exchange  Abdomen:  Soft, non tender, gravid, consistent with her gestational age, EFW 58%  Fetal heart rate:  Reassuring. Pelvis:  Adequate pelvis  Cervix: 5 cm 80% soft -2  Contraction frequency:  2 minutes    Membranes:  With consent, during vaginal exam performed ROM - small amount of meconium stained fluid was noted.      Labs:   CBC:   Lab Results   Component Value Date    WBC 8.9 03/10/2022    RBC 4.23 03/10/2022    HGB 13.0 03/10/2022    HCT 38.1 03/10/2022    MCV 90.1 03/10/2022    MCH 30.7 03/10/2022    MCHC 34.1 03/10/2022    RDW 12.2 03/10/2022     03/10/2022    MPV 11.6 03/10/2022     CMP:    Lab Results   Component Value Date     03/10/2022    K 4.0 03/10/2022    CL 90 03/10/2022    CO2 22 03/10/2022    BUN 21 03/10/2022    CREATININE 1.13 03/10/2022    GFRAA >60 03/10/2022    LABGLOM 57 03/10/2022    GLUCOSE 75 03/10/2022    PROT 7.0 03/10/2022    LABALBU 3.4 03/10/2022    CALCIUM 9.1 03/10/2022    BILITOT 0.81 03/10/2022    ALKPHOS 287 03/10/2022    AST 58 03/10/2022     03/10/2022     Uric Acid:    Lab Results   Component Value Date    URICACID 7.0 03/10/2022       ASSESSMENT AND PLAN:    Estimated length of stay: 2 midnights or less    Principal Problem:    Term pregnancy  Elevated BP in office. Based on lab results - diagnosis of pre-ecl (elevated LFT's, elevated creatinine, normal pr/cr ratio)  Plan: Epidural, Pitocin, delivery vaginally is anticipated      Labor: Admit, anticipate normal delivery, routine labor orders  Fetus: Reassuring, Cat 1  GBS: No  Other: pitocin. Discussed with Dr Mario Whittaker patient lab results - dx of pre-eclampsia. If BP remains stable then no magnesium sulfate at this time and repeat labs in morning. If labs worsen then will consider magnesium sulfate administration at that time x24 hours. If BP worsens or if develops headache, vision changes, worsening abd pain then consider initiation of mag sulfate. Will continue to manage closely with Dr Lluvia Glasgow. Patient and spouse aware of dx and possible magnesium use. MAEVE IBANEZ - MANNIE,C. N.M,3/10/2022 6:47 PM

## 2022-03-11 LAB
ABSOLUTE EOS #: <0.03 K/UL (ref 0–0.44)
ABSOLUTE IMMATURE GRANULOCYTE: 0.03 K/UL (ref 0–0.3)
ABSOLUTE LYMPH #: 1.79 K/UL (ref 1.1–3.7)
ABSOLUTE MONO #: 0.77 K/UL (ref 0.1–1.2)
ALBUMIN SERPL-MCNC: 2.9 G/DL (ref 3.5–5.2)
ALBUMIN/GLOBULIN RATIO: 0.9 (ref 1–2.5)
ALP BLD-CCNC: 230 U/L (ref 35–104)
ALT SERPL-CCNC: 84 U/L (ref 5–33)
ANION GAP SERPL CALCULATED.3IONS-SCNC: 12 MMOL/L (ref 9–17)
AST SERPL-CCNC: 50 U/L
BASOPHILS # BLD: 0 % (ref 0–2)
BASOPHILS ABSOLUTE: <0.03 K/UL (ref 0–0.2)
BILIRUB SERPL-MCNC: 0.66 MG/DL (ref 0.3–1.2)
BUN BLDV-MCNC: 18 MG/DL (ref 6–20)
BUN/CREAT BLD: 17 (ref 9–20)
CALCIUM SERPL-MCNC: 9.1 MG/DL (ref 8.6–10.4)
CHLORIDE BLD-SCNC: 105 MMOL/L (ref 98–107)
CO2: 20 MMOL/L (ref 20–31)
CREAT SERPL-MCNC: 1.08 MG/DL (ref 0.5–0.9)
EOSINOPHILS RELATIVE PERCENT: 0 % (ref 1–4)
GFR AFRICAN AMERICAN: >60 ML/MIN
GFR NON-AFRICAN AMERICAN: 60 ML/MIN
GFR SERPL CREATININE-BSD FRML MDRD: ABNORMAL ML/MIN/{1.73_M2}
GFR SERPL CREATININE-BSD FRML MDRD: ABNORMAL ML/MIN/{1.73_M2}
GLUCOSE BLD-MCNC: 82 MG/DL (ref 70–99)
HCT VFR BLD CALC: 36 % (ref 36.3–47.1)
HEMOGLOBIN: 12 G/DL (ref 11.9–15.1)
IMMATURE GRANULOCYTES: 0 %
LYMPHOCYTES # BLD: 14 % (ref 24–43)
MCH RBC QN AUTO: 30.8 PG (ref 25.2–33.5)
MCHC RBC AUTO-ENTMCNC: 33.3 G/DL (ref 28.4–34.8)
MCV RBC AUTO: 92.3 FL (ref 82.6–102.9)
MONOCYTES # BLD: 6 % (ref 3–12)
NRBC AUTOMATED: 0 PER 100 WBC
PDW BLD-RTO: 12.5 % (ref 11.8–14.4)
PLATELET # BLD: 159 K/UL (ref 138–453)
PMV BLD AUTO: 11.4 FL (ref 8.1–13.5)
POTASSIUM SERPL-SCNC: 4.1 MMOL/L (ref 3.7–5.3)
RBC # BLD: 3.9 M/UL (ref 3.95–5.11)
SEG NEUTROPHILS: 80 % (ref 36–65)
SEGMENTED NEUTROPHILS ABSOLUTE COUNT: 9.98 K/UL (ref 1.5–8.1)
SODIUM BLD-SCNC: 137 MMOL/L (ref 135–144)
TOTAL PROTEIN: 6 G/DL (ref 6.4–8.3)
WBC # BLD: 12.6 K/UL (ref 3.5–11.3)

## 2022-03-11 PROCEDURE — 99024 POSTOP FOLLOW-UP VISIT: CPT | Performed by: ADVANCED PRACTICE MIDWIFE

## 2022-03-11 PROCEDURE — 6370000000 HC RX 637 (ALT 250 FOR IP): Performed by: ADVANCED PRACTICE MIDWIFE

## 2022-03-11 PROCEDURE — 80053 COMPREHEN METABOLIC PANEL: CPT

## 2022-03-11 PROCEDURE — 2580000003 HC RX 258: Performed by: ADVANCED PRACTICE MIDWIFE

## 2022-03-11 PROCEDURE — 85025 COMPLETE CBC W/AUTO DIFF WBC: CPT

## 2022-03-11 PROCEDURE — 36415 COLL VENOUS BLD VENIPUNCTURE: CPT

## 2022-03-11 PROCEDURE — 1220000000 HC SEMI PRIVATE OB R&B

## 2022-03-11 RX ORDER — SODIUM CHLORIDE, SODIUM LACTATE, POTASSIUM CHLORIDE, CALCIUM CHLORIDE 600; 310; 30; 20 MG/100ML; MG/100ML; MG/100ML; MG/100ML
INJECTION, SOLUTION INTRAVENOUS CONTINUOUS
Status: DISCONTINUED | OUTPATIENT
Start: 2022-03-11 | End: 2022-03-11

## 2022-03-11 RX ADMIN — SODIUM CHLORIDE, POTASSIUM CHLORIDE, SODIUM LACTATE AND CALCIUM CHLORIDE: 600; 310; 30; 20 INJECTION, SOLUTION INTRAVENOUS at 04:47

## 2022-03-11 RX ADMIN — SODIUM CHLORIDE, PRESERVATIVE FREE 10 ML: 5 INJECTION INTRAVENOUS at 21:00

## 2022-03-11 RX ADMIN — IBUPROFEN 800 MG: 800 TABLET, FILM COATED ORAL at 04:10

## 2022-03-11 RX ADMIN — Medication: at 20:30

## 2022-03-11 RX ADMIN — BENZOCAINE AND LEVOMENTHOL: 200; 5 SPRAY TOPICAL at 04:09

## 2022-03-11 RX ADMIN — IBUPROFEN 800 MG: 800 TABLET, FILM COATED ORAL at 13:16

## 2022-03-11 RX ADMIN — IBUPROFEN 800 MG: 800 TABLET, FILM COATED ORAL at 21:05

## 2022-03-11 RX ADMIN — Medication 40 EACH: at 04:09

## 2022-03-11 RX ADMIN — DOCUSATE SODIUM 100 MG: 100 CAPSULE, LIQUID FILLED ORAL at 15:00

## 2022-03-11 RX ADMIN — BENZOCAINE AND LEVOMENTHOL: 200; 5 SPRAY TOPICAL at 20:30

## 2022-03-11 RX ADMIN — Medication: at 04:10

## 2022-03-11 ASSESSMENT — PAIN SCALES - GENERAL
PAINLEVEL_OUTOF10: 4
PAINLEVEL_OUTOF10: 3
PAINLEVEL_OUTOF10: 5

## 2022-03-11 NOTE — PLAN OF CARE
Problem: Discharge Planning:  Goal: Discharged to appropriate level of care  Description: Discharged to appropriate level of care  3/11/2022 0919 by Rosaura Gage RN  Outcome: Ongoing  3/10/2022 2115 by Ba Carlos RN  Outcome: Ongoing  3/10/2022 2114 by Ba Carlos RN  Outcome: Ongoing     Problem: Constipation:  Goal: Bowel elimination is within specified parameters  Description: Bowel elimination is within specified parameters  3/11/2022 0919 by Rosaura Gage RN  Outcome: Ongoing  3/10/2022 2115 by Ba Carlos RN  Outcome: Ongoing  3/10/2022 2114 by Ba Carlos RN  Outcome: Ongoing     Problem: Fluid Volume - Imbalance:  Goal: Absence of imbalanced fluid volume signs and symptoms  Description: Absence of imbalanced fluid volume signs and symptoms  3/11/2022 0919 by Rosaura Gage RN  Outcome: Ongoing  3/10/2022 2115 by Ba Carlos RN  Outcome: Ongoing  3/10/2022 2114 by Ba Carlos RN  Outcome: Ongoing  Goal: Absence of postpartum hemorrhage signs and symptoms  Description: Absence of postpartum hemorrhage signs and symptoms  3/11/2022 0919 by Rosaura Gage RN  Outcome: Ongoing  3/10/2022 2115 by Ba Carlos RN  Outcome: Ongoing  3/10/2022 2114 by Ba Carlos RN  Outcome: Ongoing     Problem: Infection - Risk of, Puerperal Infection:  Goal: Will show no infection signs and symptoms  Description: Will show no infection signs and symptoms  3/11/2022 0919 by Rosaura Gage RN  Outcome: Ongoing  3/10/2022 2115 by Ba Carlos RN  Outcome: Ongoing  3/10/2022 2114 by Ba Carlos RN  Outcome: Ongoing     Problem: Mood - Altered:  Goal: Mood stable  Description: Mood stable  3/11/2022 0919 by Rosaura Gage RN  Outcome: Ongoing  3/10/2022 2115 by Ba Carlos RN  Outcome: Ongoing  3/10/2022 2114 by Ba Carlos RN  Outcome: Ongoing     Problem: Pain - Acute:  Goal: Pain level will decrease  Description: Pain level will decrease  3/11/2022 0919 by Garry Burger RN  Outcome: Ongoing  3/10/2022 2115 by Sherrill Duong RN  Outcome: Ongoing  3/10/2022 2114 by Sherrill Duong RN  Outcome: Ongoing

## 2022-03-11 NOTE — FLOWSHEET NOTE
B.Gerard on unit informed of fundus U/U off to right. Fundus palpates firm, bleeding minimal. No new orders at this time.

## 2022-03-11 NOTE — ANESTHESIA POSTPROCEDURE EVALUATION
Department of Anesthesiology  Postprocedure Note    Patient: Keshia Soriano  MRN: [id number]  YOB: 1990  Date of evaluation: 3/11/2022  Time:  3:33 PM     Procedure Summary     Date: 03/10/22 Room / Location:     Anesthesia Start: 1704 Anesthesia Stop: 2002    Procedure: Labor Analgesia Diagnosis:     Scheduled Providers:  Responsible Provider: MAEVE De La Cruz CRNA    Anesthesia Type: epidural ASA Status: 2          Anesthesia Type: epidural    Corey Phase I:      Corey Phase II:      Last vitals: Reviewed and per EMR flowsheets.        Anesthesia Post Evaluation    Patient location during evaluation: bedside  Patient participation: complete - patient participated  Level of consciousness: awake and alert  Airway patency: patent  Nausea & Vomiting: no nausea and no vomiting  Complications: no  Cardiovascular status: blood pressure returned to baseline and hemodynamically stable  Respiratory status: acceptable and room air  Hydration status: euvolemic  Comments: Patient reports all sensory return to baseline, denies any needs at this time

## 2022-03-11 NOTE — PLAN OF CARE
Problem: Pain:  Description: Pain management should include both nonpharmacologic and pharmacologic interventions.   Goal: Pain level will decrease  Description: Pain level will decrease  3/10/2022 2114 by Yao Ferguson RN  Outcome: Completed  3/10/2022 1448 by Daryle Spates, RN  Outcome: Ongoing  Goal: Control of acute pain  Description: Control of acute pain  3/10/2022 2114 by Yao Ferguson RN  Outcome: Completed  3/10/2022 1448 by Daryle Spates, RN  Outcome: Ongoing  Goal: Control of chronic pain  Description: Control of chronic pain  3/10/2022 2114 by Yao Ferguson RN  Outcome: Completed  3/10/2022 1448 by Daryle Spates, RN  Outcome: Ongoing     Problem: Anxiety:  Goal: Level of anxiety will decrease  Description: Level of anxiety will decrease  3/10/2022 2114 by Yao Ferguson RN  Outcome: Completed  3/10/2022 1448 by Daryle Spates, RN  Outcome: Ongoing     Problem: Breathing Pattern - Ineffective:  Goal: Able to breathe comfortably  Description: Able to breathe comfortably  3/10/2022 2114 by Yao Ferguson RN  Outcome: Completed  3/10/2022 1448 by Daryle Spates, RN  Outcome: Ongoing     Problem: Fluid Volume - Imbalance:  Goal: Absence of imbalanced fluid volume signs and symptoms  Description: Absence of imbalanced fluid volume signs and symptoms  3/10/2022 2114 by Yao Ferguson RN  Outcome: Completed  3/10/2022 1448 by Daryle Spates, RN  Outcome: Ongoing  Goal: Absence of intrapartum hemorrhage signs and symptoms  Description: Absence of intrapartum hemorrhage signs and symptoms  3/10/2022 2114 by Yao Ferguson RN  Outcome: Completed  3/10/2022 1448 by Daryle Spates, RN  Outcome: Ongoing     Problem: Infection - Intrapartum Infection:  Goal: Will show no infection signs and symptoms  Description: Will show no infection signs and symptoms  3/10/2022 2114 by Yao Ferguson RN  Outcome: Completed  3/10/2022 1448 by Daryle Spates, RN  Outcome: Ongoing     Problem: Labor Process - Prolonged:  Goal: Labor progression, first stage, within specified pattern  Description: Labor progression, first stage, within specified pattern  3/10/2022 2114 by Rolanda Byers RN  Outcome: Completed  3/10/2022 1448 by Afia Hansen RN  Outcome: Ongoing  Goal: Labor progession, second stage, within specified pattern  Description: Labor progession, second stage, within specified pattern  3/10/2022 2114 by Rolanda Byers RN  Outcome: Completed  3/10/2022 1448 by Afia Hansen RN  Outcome: Ongoing  Goal: Uterine contractions within specified parameters  Description: Uterine contractions within specified parameters  3/10/2022 2114 by Rolanda Byers RN  Outcome: Completed  3/10/2022 1448 by Afia Hansen RN  Outcome: Ongoing     Problem:  Screening:  Goal: Ability to make informed decisions regarding treatment has improved  Description: Ability to make informed decisions regarding treatment has improved  3/10/2022 2114 by Rolanda Byers RN  Outcome: Completed  3/10/2022 1448 by Afia Hansen RN  Outcome: Ongoing     Problem: Pain - Acute:  Goal: Pain level will decrease  Description: Pain level will decrease  3/10/2022 2114 by Rolanda Byers RN  Outcome: Completed  3/10/2022 1448 by Afia Hansen RN  Outcome: Ongoing  Goal: Able to cope with pain  Description: Able to cope with pain  3/10/2022 2114 by Rolanda Byers RN  Outcome: Completed  3/10/2022 1448 by Afia Hansen RN  Outcome: Ongoing     Problem: Tissue Perfusion - Uteroplacental, Altered:  Description: [TRUNCATED] For intrapartum patients with recurrent variable decelerations of the fetal heart rate, consider transcervical amnioinfusion. For patients in labor, avoid prophylactic use of continuous maternal oxygen supplementation to prevent nonreassu . ..   Goal: Absence of abnormal fetal heart rate pattern  Description: Absence of abnormal fetal heart rate pattern  3/10/2022 2114 by Ariadna Matthews RN  Outcome: Completed  3/10/2022 1448 by Ese Waite RN  Outcome: Ongoing     Problem: Urinary Retention:  Goal: Experiences of bladder distention will decrease  Description: Experiences of bladder distention will decrease  3/10/2022 2114 by Ariadna Matthews RN  Outcome: Completed  3/10/2022 1448 by Ese Waite RN  Outcome: Ongoing  Goal: Urinary elimination within specified parameters  Description: Urinary elimination within specified parameters  3/10/2022 2114 by rAiadna Matthews RN  Outcome: Completed  3/10/2022 1448 by Ese Waite RN  Outcome: Ongoing     Problem: Discharge Planning:  Goal: Discharged to appropriate level of care  Description: Discharged to appropriate level of care  3/10/2022 2115 by Ariadna Matthews RN  Outcome: Ongoing  3/10/2022 2114 by Ariadna Matthews RN  Outcome: Ongoing     Problem: Constipation:  Goal: Bowel elimination is within specified parameters  Description: Bowel elimination is within specified parameters  3/10/2022 2115 by Ariadna Matthews RN  Outcome: Ongoing  3/10/2022 2114 by Ariadna Matthews RN  Outcome: Ongoing     Problem: Fluid Volume - Imbalance:  Goal: Absence of imbalanced fluid volume signs and symptoms  Description: Absence of imbalanced fluid volume signs and symptoms  3/10/2022 2115 by Ariadna Matthews RN  Outcome: Ongoing  3/10/2022 2114 by Ariadna Matthews RN  Outcome: Ongoing  Goal: Absence of postpartum hemorrhage signs and symptoms  Description: Absence of postpartum hemorrhage signs and symptoms  3/10/2022 2115 by Ariadna Matthews RN  Outcome: Ongoing  3/10/2022 2114 by Ariadna Matthews RN  Outcome: Ongoing     Problem: Infection - Risk of, Puerperal Infection:  Goal: Will show no infection signs and symptoms  Description: Will show no infection signs and symptoms  3/10/2022 2115 by Ariadna Matthews RN  Outcome: Ongoing  3/10/2022 2114 by Ariadna Matthews RN  Outcome: Ongoing     Problem: Mood - Altered:  Goal: Mood stable  Description: Mood stable  3/10/2022 2115 by Joseluis Benz RN  Outcome: Ongoing  3/10/2022 2114 by Joseluis Benz RN  Outcome: Ongoing     Problem: Pain - Acute:  Goal: Pain level will decrease  Description: Pain level will decrease  3/10/2022 2115 by Joseluis Benz RN  Outcome: Ongoing  3/10/2022 2114 by Joseluis Benz RN  Outcome: Ongoing

## 2022-03-11 NOTE — PROGRESS NOTES
Department of Obstetrics and Gynecology  Labor and Delivery       Post Partum Progress Note             SUBJECTIVE:  1st day postpartum, s/p ; had elevated bp in office yesterday and severe signs, was induced at term. Feels good today, blood pressures are normal, denies headache, had one episode of blurred vision when she looked at phone this a.m. but she wears glasses and did not have them on. Labs yesterday showed elevated kidney function and liver function but are trending down today.     OBJECTIVE:      Vitals:  /68   Pulse (!) 43   Temp 98.3 °F (36.8 °C) (Oral)   Resp 16   Ht 5' 5\" (1.651 m)   Wt 151 lb (68.5 kg)   LMP 2021 (Exact Date)   SpO2 92%   Breastfeeding Unknown   BMI 25.13 kg/m²   Patient Vitals for the past 24 hrs:   BP Temp Temp src Pulse Resp SpO2 Height Weight   22 1121 139/68 98.3 °F (36.8 °C) Oral (!) 43 16 -- -- --   22 0731 137/73 97.8 °F (36.6 °C) Oral 75 16 -- -- --   22 0437 138/80 97.9 °F (36.6 °C) Axillary 55 18 -- -- --   03/10/22 2322 118/68 -- -- 54 18 -- -- --   03/10/22 2307 129/72 -- -- 55 -- -- -- --   03/10/22 2252 120/71 -- -- 51 16 -- -- --   03/10/22 2237 124/76 -- -- 61 -- -- -- --   03/10/22 2236 -- -- -- -- 16 -- -- --   03/10/22 2222 133/78 -- -- 60 16 -- -- --   03/10/22 2207 133/86 -- -- 61 16 -- -- --   03/10/22 2152 139/75 -- -- 58 18 -- -- --   03/10/22 2137 136/69 -- -- 50 18 -- -- --   03/10/22 2122 132/61 -- -- 57 16 -- -- --   03/10/22 2110 137/61 -- -- 63 18 -- -- --   03/10/22 2052 (!) 121/58 -- -- 64 20 -- -- --   03/10/22 2037 125/60 -- -- 72 20 -- -- --   03/10/22 2022 126/66 -- -- 67 18 -- -- --   03/10/22 2006 -- 97.9 °F (36.6 °C) Axillary -- 18 -- -- --   03/10/22 2003 -- -- -- -- -- 92 % -- --   03/10/22 2000 -- -- -- -- -- 100 % -- --   03/10/22 1937 (!) 104/59 -- -- 66 -- -- -- --   03/10/22 1922 (!) 102/59 -- -- 66 -- -- -- --   03/10/22 1920 -- -- -- -- -- 98 % -- --   03/10/22 1918 (!) 103/54 97.9 °F (36.6 °C) Oral 85 -- -- -- --   03/10/22 1915 -- -- -- -- -- 98 % -- --   03/10/22 1910 -- -- -- -- -- 99 % -- --   03/10/22 1905 -- -- -- -- -- 98 % -- --   03/10/22 1900 -- -- -- -- 18 100 % -- --   03/10/22 1855 -- -- -- -- -- 100 % -- --   03/10/22 1852 121/63 -- -- 71 -- -- -- --   03/10/22 1850 -- -- -- -- -- 98 % -- --   03/10/22 1845 -- -- -- -- -- 99 % -- --   03/10/22 1840 -- -- -- -- -- 99 % -- --   03/10/22 1837 (!) 120/59 -- -- 71 -- -- -- --   03/10/22 1835 -- -- -- -- -- 99 % -- --   03/10/22 1833 -- -- -- -- -- (!) 89 % -- --   03/10/22 1830 -- -- -- -- -- 100 % -- --   03/10/22 1825 -- -- -- -- -- 100 % -- --   03/10/22 1824 -- -- -- -- -- 93 % -- --   03/10/22 1822 (!) 119/59 -- -- 69 -- -- -- --   03/10/22 1820 -- -- -- -- -- 99 % -- --   03/10/22 1819 -- -- -- -- -- 94 % -- --   03/10/22 1815 -- -- -- -- -- 99 % -- --   03/10/22 1810 -- -- -- -- -- 98 % -- --   03/10/22 1807 (!) 118/56 -- -- 60 -- -- -- --   03/10/22 1805 -- -- -- -- -- 100 % -- --   03/10/22 1800 -- -- -- -- -- 100 % -- --   03/10/22 1755 -- -- -- -- -- 99 % -- --   03/10/22 1752 121/62 -- -- 68 -- -- -- --   03/10/22 1750 -- -- -- -- -- 100 % -- --   03/10/22 1745 -- -- -- -- -- 100 % -- --   03/10/22 1740 -- -- -- -- -- 99 % -- --   03/10/22 1736 126/65 -- -- 72 -- -- -- --   03/10/22 1735 -- -- -- -- -- 100 % -- --   03/10/22 1733 124/77 -- -- 78 -- -- -- --   03/10/22 1729 123/67 -- -- 73 -- 99 % -- --   03/10/22 1727 122/65 -- -- 69 -- -- -- --   03/10/22 1725 -- -- -- -- -- 100 % -- --   03/10/22 1724 (!) 120/56 -- -- 64 -- -- -- --   03/10/22 1721 (!) 121/59 -- -- 78 -- -- -- --   03/10/22 1720 -- -- -- -- -- 100 % -- --   03/10/22 1718 135/73 -- -- 75 -- -- -- --   03/10/22 1715 (!) 141/76 -- -- 75 -- 100 % -- --   03/10/22 1712 (!) 150/73 -- -- 85 -- -- -- --   03/10/22 1710 -- -- -- -- -- 100 % -- --   03/10/22 1709 (!) 146/71 -- -- 76 -- 100 % -- --   03/10/22 1706 (!) 146/82 -- -- 76 -- -- -- --   03/10/22 1705 -- -- -- -- -- 97 % -- --   03/10/22 1703 (!) 140/81 -- -- 72 -- -- -- --   03/10/22 1700 -- -- -- -- -- 98 % -- --   03/10/22 1638 (!) 140/80 -- -- 72 -- -- -- --   03/10/22 1608 131/80 -- -- 62 -- -- -- --   03/10/22 1539 132/82 -- -- 58 -- -- -- --   03/10/22 1508 124/75 -- -- 56 -- -- -- --   03/10/22 1438 120/80 -- -- 63 -- -- -- --   03/10/22 1408 121/71 -- -- 74 -- -- -- --   03/10/22 1353 129/79 97.8 °F (36.6 °C) Oral 74 18 -- 5' 5\" (1.651 m) 151 lb (68.5 kg)   03/10/22 1338 129/79 -- -- 74 -- -- -- --         ABDOMEN: normal shape, position and consistency  GENITAL/URINARY:  External Genitalia:  General appearance; normal, Hair distribution; normal, Lesions absent  Uterus:  Size normal  Breast:normal appearance, no masses or tenderness  Cor: RRR no Murmurs  Pulmonary: clear to auscultation anterior and posterior  Extremities: no Clubbing cyanosis or ecchymosis    DATA:    CBC:   Lab Results   Component Value Date    WBC 12.6 03/11/2022    RBC 3.90 03/11/2022    HGB 12.0 03/11/2022    HCT 36.0 03/11/2022    MCV 92.3 03/11/2022    MCH 30.8 03/11/2022    MCHC 33.3 03/11/2022    RDW 12.5 03/11/2022     03/11/2022    MPV 11.4 03/11/2022     CMP:    Lab Results   Component Value Date     03/11/2022    K 4.1 03/11/2022     03/11/2022    CO2 20 03/11/2022    BUN 18 03/11/2022    CREATININE 1.08 03/11/2022    GFRAA >60 03/11/2022    LABGLOM 60 03/11/2022    GLUCOSE 82 03/11/2022    PROT 6.0 03/11/2022    LABALBU 2.9 03/11/2022    CALCIUM 9.1 03/11/2022    BILITOT 0.66 03/11/2022    ALKPHOS 230 03/11/2022    AST 50 03/11/2022    ALT 84 03/11/2022     LDH:  No results found for: LDH  Uric Acid:    Lab Results   Component Value Date    URICACID 7.0 03/10/2022         ASSESSMENT :      Principal Problem:    Term pregnancy  Plan: delivered  Active Problems:    Pre-eclampsia in third trimester  Plan: delivered, stable    Elevated liver function tests  Plan: trending down    Vaginal delivery          Plan:  Routine orders and instrutions, repeat cmp and cbc tomorrow

## 2022-03-11 NOTE — FLOWSHEET NOTE
Pt repositioned to semi fowlers position and became nauseated; vomited unmeasured amount; cool washcloth applied to forehead

## 2022-03-11 NOTE — L&D DELIVERY NOTE
Mother's Information      Labor Events     labor?: No  Rupture type: AROM, Intact  Fluid color: Meconium  Fluid odor: None       Mother Delivery Information    Lacerations: 1st  # of Repair Packets: 1  Surgical or Additional Est. Blood Loss (mL): 0 (View Only): Edit in Flowsheets   Combined Est. Blood Loss (mL): 0            Cielo Arroyo Baby Boy Dima Mole [[id number]]      Labor Events     labor?: No   steroids?: None  Cervical ripening date/time:     Antibiotics received during labor?: No  Rupture Identifier: Sac 1   Rupture date/time: 3/10/22 17:55:00   Rupture type: AROM, Intact  Fluid color: Meconium  Meconium consistency: Moderate  Fluid odor: None  Induction: Oxytocin  Indications for induction: Hypertension  Labor complications: None              Labor Event Times      Labor onset date/time: 3/10/22 1400 EST     Dilation complete date/time:        Start pushing:      Decision time (emergent ): Anesthesia    Method: Epidural       Assisted Delivery Details    Forceps attempted?: No  Vacuum extractor attempted?: No       Document Additional Attempt         Document Additional Attempt                 Shoulder Dystocia    Shoulder dystocia present?: No  Add Second Maneuver  Add Third Maneuver  Add Fourth Maneuver  Add Fifth Maneuver  Add Sixth Maneuver  Add Seventh Maneuver  Add Eighth Maneuver  Add Ninth Maneuver       Bates Presentation    Presentation: Vertex  _: Occiput  _: Anterior        Information    Head delivery date/time: 3/10/2022 20:02:00   Changing the 's delivery date/time could affect patient care.:      Delivery date/time:  3/10/22 2002   Delivery type: Vaginal, Spontaneous    Details:            Delivery Providers    Delivering clinician:        Cord    Vessels: 3 Vessels  Complications: None  Delayed cord clamping?: Yes  Gases sent?: No  Stem cell collection (by provider):  No       Placenta    Removal: Spontaneous  Appearance: Intact  Measurements           Delivery Information      Perineal lacerations: 1st Repaired: Yes     Surgical or additional est. blood loss (mL): 0 (View Only): Edit in Flowsheets   Combined est. blood loss (mL): 0       Other Procedures    Procedures: None          Placenta to pathology secondary to 1/3 was very calcified and abnormal appearing. With delivery there was also a left compound hand under infants chin - with gentle sweep it was delivered over intact perineum prior to delivery of infant body. Reviewed plan with patient and spouse again regarding repeat labs in morning, continue hydration over night, and possible use of magnesium sulfate.

## 2022-03-12 VITALS
RESPIRATION RATE: 16 BRPM | TEMPERATURE: 98.2 F | HEART RATE: 69 BPM | DIASTOLIC BLOOD PRESSURE: 82 MMHG | SYSTOLIC BLOOD PRESSURE: 118 MMHG | WEIGHT: 151 LBS | BODY MASS INDEX: 25.16 KG/M2 | OXYGEN SATURATION: 92 % | HEIGHT: 65 IN

## 2022-03-12 PROBLEM — Z34.90 TERM PREGNANCY: Status: RESOLVED | Noted: 2022-03-10 | Resolved: 2022-03-12

## 2022-03-12 LAB
ABSOLUTE EOS #: 0.14 K/UL (ref 0–0.44)
ABSOLUTE IMMATURE GRANULOCYTE: <0.03 K/UL (ref 0–0.3)
ABSOLUTE LYMPH #: 3.14 K/UL (ref 1.1–3.7)
ABSOLUTE MONO #: 0.74 K/UL (ref 0.1–1.2)
ALBUMIN SERPL-MCNC: 2.9 G/DL (ref 3.5–5.2)
ALBUMIN/GLOBULIN RATIO: 0.9 (ref 1–2.5)
ALP BLD-CCNC: 215 U/L (ref 35–104)
ALT SERPL-CCNC: 65 U/L (ref 5–33)
ANION GAP SERPL CALCULATED.3IONS-SCNC: 12 MMOL/L (ref 9–17)
AST SERPL-CCNC: 38 U/L
BASOPHILS # BLD: 0 % (ref 0–2)
BASOPHILS ABSOLUTE: 0.04 K/UL (ref 0–0.2)
BILIRUB SERPL-MCNC: 0.4 MG/DL (ref 0.3–1.2)
BUN BLDV-MCNC: 14 MG/DL (ref 6–20)
BUN/CREAT BLD: 15 (ref 9–20)
CALCIUM SERPL-MCNC: 9 MG/DL (ref 8.6–10.4)
CHLORIDE BLD-SCNC: 105 MMOL/L (ref 98–107)
CO2: 22 MMOL/L (ref 20–31)
CREAT SERPL-MCNC: 0.93 MG/DL (ref 0.5–0.9)
EOSINOPHILS RELATIVE PERCENT: 1 % (ref 1–4)
GFR AFRICAN AMERICAN: >60 ML/MIN
GFR NON-AFRICAN AMERICAN: >60 ML/MIN
GFR SERPL CREATININE-BSD FRML MDRD: ABNORMAL ML/MIN/{1.73_M2}
GFR SERPL CREATININE-BSD FRML MDRD: ABNORMAL ML/MIN/{1.73_M2}
GLUCOSE BLD-MCNC: 71 MG/DL (ref 70–99)
HCT VFR BLD CALC: 35.6 % (ref 36.3–47.1)
HEMOGLOBIN: 11.6 G/DL (ref 11.9–15.1)
IMMATURE GRANULOCYTES: 0 %
LYMPHOCYTES # BLD: 28 % (ref 24–43)
MCH RBC QN AUTO: 30.5 PG (ref 25.2–33.5)
MCHC RBC AUTO-ENTMCNC: 32.6 G/DL (ref 28.4–34.8)
MCV RBC AUTO: 93.7 FL (ref 82.6–102.9)
MONOCYTES # BLD: 7 % (ref 3–12)
NRBC AUTOMATED: 0 PER 100 WBC
PDW BLD-RTO: 12.8 % (ref 11.8–14.4)
PLATELET # BLD: 171 K/UL (ref 138–453)
PMV BLD AUTO: 11 FL (ref 8.1–13.5)
POTASSIUM SERPL-SCNC: 4.2 MMOL/L (ref 3.7–5.3)
RBC # BLD: 3.8 M/UL (ref 3.95–5.11)
SEG NEUTROPHILS: 64 % (ref 36–65)
SEGMENTED NEUTROPHILS ABSOLUTE COUNT: 7.05 K/UL (ref 1.5–8.1)
SODIUM BLD-SCNC: 139 MMOL/L (ref 135–144)
TOTAL PROTEIN: 6 G/DL (ref 6.4–8.3)
WBC # BLD: 11.1 K/UL (ref 3.5–11.3)

## 2022-03-12 PROCEDURE — 99024 POSTOP FOLLOW-UP VISIT: CPT | Performed by: ADVANCED PRACTICE MIDWIFE

## 2022-03-12 PROCEDURE — 36415 COLL VENOUS BLD VENIPUNCTURE: CPT

## 2022-03-12 PROCEDURE — 6370000000 HC RX 637 (ALT 250 FOR IP): Performed by: ADVANCED PRACTICE MIDWIFE

## 2022-03-12 PROCEDURE — 80053 COMPREHEN METABOLIC PANEL: CPT

## 2022-03-12 PROCEDURE — 85025 COMPLETE CBC W/AUTO DIFF WBC: CPT

## 2022-03-12 RX ADMIN — IBUPROFEN 800 MG: 800 TABLET, FILM COATED ORAL at 08:36

## 2022-03-12 RX ADMIN — DOCUSATE SODIUM 100 MG: 100 CAPSULE, LIQUID FILLED ORAL at 08:36

## 2022-03-12 ASSESSMENT — PAIN SCALES - GENERAL: PAINLEVEL_OUTOF10: 2

## 2022-03-12 NOTE — FLOWSHEET NOTE
Discharge instructions reviewed with mother. Verb understanding of self and infant care and follow up visits. Informed pt of lab work that was ordered for 3-. Pt verb understanding.

## 2022-03-12 NOTE — PLAN OF CARE
Problem: Discharge Planning:  Goal: Discharged to appropriate level of care  Description: Discharged to appropriate level of care  3/11/2022 2040 by Andrew Jauregui RN  Outcome: Ongoing  3/11/2022 0919 by Robi Hinojosa RN  Outcome: Ongoing     Problem: Constipation:  Goal: Bowel elimination is within specified parameters  Description: Bowel elimination is within specified parameters  3/11/2022 2040 by Andrew Jauregui RN  Outcome: Ongoing  3/11/2022 0919 by Robi Hinojosa RN  Outcome: Ongoing     Problem: Fluid Volume - Imbalance:  Goal: Absence of imbalanced fluid volume signs and symptoms  Description: Absence of imbalanced fluid volume signs and symptoms  3/11/2022 2040 by Andrew Jauregui RN  Outcome: Ongoing  3/11/2022 0919 by Robi Hinojosa RN  Outcome: Ongoing  Goal: Absence of postpartum hemorrhage signs and symptoms  Description: Absence of postpartum hemorrhage signs and symptoms  3/11/2022 2040 by Andrew Jauregui RN  Outcome: Ongoing  3/11/2022 0919 by Robi Hinojosa RN  Outcome: Ongoing     Problem: Infection - Risk of, Puerperal Infection:  Goal: Will show no infection signs and symptoms  Description: Will show no infection signs and symptoms  3/11/2022 2040 by Andrew Jauregui RN  Outcome: Ongoing  3/11/2022 0919 by Robi Hinojosa RN  Outcome: Ongoing     Problem: Mood - Altered:  Goal: Mood stable  Description: Mood stable  3/11/2022 2040 by Andrew Jauregui RN  Outcome: Ongoing  3/11/2022 0919 by Robi Hinojosa RN  Outcome: Ongoing     Problem: Pain - Acute:  Goal: Pain level will decrease  Description: Pain level will decrease  3/11/2022 2040 by Andrew Jauregui RN  Outcome: Ongoing  3/11/2022 0919 by Robi Hinojosa RN  Outcome: Ongoing

## 2022-03-12 NOTE — PROGRESS NOTES
Department of Obstetrics and Gynecology  Labor and Delivery       Post Partum Progress Note             SUBJECTIVE:  2nd day postpartum, hx of pre-e with laboratory changes (elevated kidney and liver functions) denies c/o today, requests discharge, has noted some low heart rates on her iwatch    OBJECTIVE:      Vitals:  /82   Pulse 69   Temp 98.2 °F (36.8 °C) (Oral)   Resp 16   Ht 5' 5\" (1.651 m)   Wt 151 lb (68.5 kg)   LMP 06/13/2021 (Exact Date)   SpO2 92%   Breastfeeding Unknown   BMI 25.13 kg/m²   Patient Vitals for the past 24 hrs:   BP Temp Temp src Pulse Resp   03/12/22 0826 118/82 98.2 °F (36.8 °C) Oral 69 16   03/12/22 0508 119/75 97.9 °F (36.6 °C) Oral 50 18   03/11/22 2029 135/73 98.3 °F (36.8 °C) Oral 59 16   03/11/22 1525 (!) 140/81 97.8 °F (36.6 °C) Oral 57 16   03/11/22 1121 139/68 98.3 °F (36.8 °C) Oral (!) 43 16         ABDOMEN: normal shape, position and consistency  GENITAL/URINARY:  External Genitalia:  General appearance; normal, Hair distribution; normal, Lesions absent  Uterus:  Size normal  Breast:normal appearance, no masses or tenderness  Cor: RRR no Murmurs  Pulmonary: clear to auscultation anterior and posterior  Extremities: no Clubbing cyanosis or ecchymosis    DATA:    CBC:   Lab Results   Component Value Date    WBC 11.1 03/12/2022    RBC 3.80 03/12/2022    HGB 11.6 03/12/2022    HCT 35.6 03/12/2022    MCV 93.7 03/12/2022    MCH 30.5 03/12/2022    MCHC 32.6 03/12/2022    RDW 12.8 03/12/2022     03/12/2022    MPV 11.0 03/12/2022         ASSESSMENT :      Principal Problem:    Term pregnancy  Plan: delivered  Active Problems:    Pre-eclampsia in third trimester  Plan: stable    Elevated liver function tests  Plan: trending down    Vaginal delivery          Plan:  D/c home, will repeat labwork on Monday; rto 1 and 6 weeks postpartum, routine instructions, reviewed danger signs of heart rate issues, fatigue, dizziness, syncope, chest pain, dyspnea.   If present to

## 2022-03-12 NOTE — DISCHARGE SUMMARY
Obstetrical Discharge Form        Gestational Age:38w4d    Antepartum complications: atypical pre eclampsia    Date of Delivery: 3/10/22    Type of Delivery:        Delivered By:  CONNIE MCFARLANE CNCHULA    Assisted By:N/A}      Baby: male    Anesthesia:  epidural      Intrapartum complications: pre eclampsia    Feeding method: breast    Blood type: A NEGATIVE      Rubella:    Rubella Antibody, IGG   Date Value Ref Range Status   2021 308.9 IU/mL Final     Comment:                 REFERENCE RANGE:  <5.0       NON-REACTIVE (non-immune)  5.0 TO 9.9 EQUIVOCAL  >=10.0     REACTIVE     (immune)             T. Pallidium, IGG:    T. pallidum, IgG   Date Value Ref Range Status   2021 NONREACTIVE NONREACTIVE Final     Comment:           T. pallidum antibodies are not detected. There is no serological evidence of infection with T. pallidum (early primary syphilis   cannot be excluded). Retest in 2-4 weeks if syphilis is clinically suspect.              Hepatitis B Surface Antigen:   Hepatitis B Surface Ag   Date Value Ref Range Status   2021 NONREACTIVE NONREACTIVE Final     HIV:  No results found for: [id number]    Results for orders placed or performed during the hospital encounter of 03/10/22   DRUG SCREEN MULTI URINE   Result Value Ref Range    Amphetamine Screen, Ur NEGATIVE NEGATIVE    Barbiturate Screen, Ur NEGATIVE NEGATIVE    Benzodiazepine Screen, Urine NEGATIVE NEGATIVE    Cocaine Metabolite, Urine NEGATIVE NEGATIVE    Methadone Screen, Urine NEGATIVE NEGATIVE    Opiates, Urine NEGATIVE NEGATIVE    Phencyclidine, Urine NEGATIVE NEGATIVE    Propoxyphene, Urine NEGATIVE NEGATIVE    Cannabinoid Scrn, Ur NEGATIVE NEGATIVE    Oxycodone Screen, Ur NEGATIVE NEGATIVE    Methamphetamine, Urine NEGATIVE NEGATIVE    Tricyclic Antidepressants, Urine NEGATIVE NEGATIVE    Buprenorphine Urine NEGATIVE NEGATIVE   CBC auto differential   Result Value Ref Range    WBC 8.9 3.5 - 11.3 k/uL    RBC 4.23 3.95 - 5.11 m/uL Hemoglobin 13.0 11.9 - 15.1 g/dL    Hematocrit 38.1 36.3 - 47.1 %    MCV 90.1 82.6 - 102.9 fL    MCH 30.7 25.2 - 33.5 pg    MCHC 34.1 28.4 - 34.8 g/dL    RDW 12.2 11.8 - 14.4 %    Platelets 253 223 - 486 k/uL    MPV 11.6 8.1 - 13.5 fL    NRBC Automated 0.0 0.0 per 100 WBC    Seg Neutrophils 72 (H) 36 - 65 %    Lymphocytes 21 (L) 24 - 43 %    Monocytes 6 3 - 12 %    Eosinophils % 0 (L) 1 - 4 %    Basophils 1 0 - 2 %    Immature Granulocytes 0 0 %    Segs Absolute 6.41 1.50 - 8.10 k/uL    Absolute Lymph # 1.89 1.10 - 3.70 k/uL    Absolute Mono # 0.49 0.10 - 1.20 k/uL    Absolute Eos # <0.03 0.00 - 0.44 k/uL    Basophils Absolute 0.05 0.00 - 0.20 k/uL    Absolute Immature Granulocyte 0.04 0.00 - 0.30 k/uL   Comprehensive metabolic panel   Result Value Ref Range    Glucose 75 70 - 99 mg/dL    BUN 21 (H) 6 - 20 mg/dL    CREATININE 1.13 (H) 0.50 - 0.90 mg/dL    Bun/Cre Ratio 19 9 - 20    Calcium 9.1 8.6 - 10.4 mg/dL    Sodium 124 (L) 135 - 144 mmol/L    Potassium 4.0 3.7 - 5.3 mmol/L    Chloride 90 (L) 98 - 107 mmol/L    CO2 22 20 - 31 mmol/L    Anion Gap 12 9 - 17 mmol/L    Alkaline Phosphatase 287 (H) 35 - 104 U/L     (H) 5 - 33 U/L    AST 58 (H) <32 U/L    Total Bilirubin 0.81 0.3 - 1.2 mg/dL    Total Protein 7.0 6.4 - 8.3 g/dL    Albumin 3.4 (L) 3.5 - 5.2 g/dL    Albumin/Globulin Ratio 0.9 (L) 1.0 - 2.5    GFR Non- 57 (L) >60 mL/min    GFR African American >60 >60 mL/min    GFR Comment          GFR Staging         Protein / Creatinine Ratio, Urine   Result Value Ref Range    Total Protein, Urine 5 mg/dL    Creatinine, Ur 46.0 28.0 - 217.0 mg/dL    Urine Total Protein Creatinine Ratio 0.11 0.00 - 0.20   Uric Acid   Result Value Ref Range    Uric Acid 7.0 (H) 2.4 - 5.7 mg/dL   CBC with Auto Differential   Result Value Ref Range    WBC 12.6 (H) 3.5 - 11.3 k/uL    RBC 3.90 (L) 3.95 - 5.11 m/uL    Hemoglobin 12.0 11.9 - 15.1 g/dL    Hematocrit 36.0 (L) 36.3 - 47.1 %    MCV 92.3 82.6 - 102.9 fL MCH 30.8 25.2 - 33.5 pg    MCHC 33.3 28.4 - 34.8 g/dL    RDW 12.5 11.8 - 14.4 %    Platelets 873 947 - 922 k/uL    MPV 11.4 8.1 - 13.5 fL    NRBC Automated 0.0 0.0 per 100 WBC    Seg Neutrophils 80 (H) 36 - 65 %    Lymphocytes 14 (L) 24 - 43 %    Monocytes 6 3 - 12 %    Eosinophils % 0 (L) 1 - 4 %    Basophils 0 0 - 2 %    Immature Granulocytes 0 0 %    Segs Absolute 9.98 (H) 1.50 - 8.10 k/uL    Absolute Lymph # 1.79 1.10 - 3.70 k/uL    Absolute Mono # 0.77 0.10 - 1.20 k/uL    Absolute Eos # <0.03 0.00 - 0.44 k/uL    Basophils Absolute <0.03 0.00 - 0.20 k/uL    Absolute Immature Granulocyte 0.03 0.00 - 0.30 k/uL   Comprehensive Metabolic Panel   Result Value Ref Range    Glucose 82 70 - 99 mg/dL    BUN 18 6 - 20 mg/dL    CREATININE 1.08 (H) 0.50 - 0.90 mg/dL    Bun/Cre Ratio 17 9 - 20    Calcium 9.1 8.6 - 10.4 mg/dL    Sodium 137 135 - 144 mmol/L    Potassium 4.1 3.7 - 5.3 mmol/L    Chloride 105 98 - 107 mmol/L    CO2 20 20 - 31 mmol/L    Anion Gap 12 9 - 17 mmol/L    Alkaline Phosphatase 230 (H) 35 - 104 U/L    ALT 84 (H) 5 - 33 U/L    AST 50 (H) <32 U/L    Total Bilirubin 0.66 0.3 - 1.2 mg/dL    Total Protein 6.0 (L) 6.4 - 8.3 g/dL    Albumin 2.9 (L) 3.5 - 5.2 g/dL    Albumin/Globulin Ratio 0.9 (L) 1.0 - 2.5    GFR Non-African American 60 (L) >60 mL/min    GFR African American >60 >60 mL/min    GFR Comment          GFR Staging         Comprehensive Metabolic Panel   Result Value Ref Range    Glucose 71 70 - 99 mg/dL    BUN 14 6 - 20 mg/dL    CREATININE 0.93 (H) 0.50 - 0.90 mg/dL    Bun/Cre Ratio 15 9 - 20    Calcium 9.0 8.6 - 10.4 mg/dL    Sodium 139 135 - 144 mmol/L    Potassium 4.2 3.7 - 5.3 mmol/L    Chloride 105 98 - 107 mmol/L    CO2 22 20 - 31 mmol/L    Anion Gap 12 9 - 17 mmol/L    Alkaline Phosphatase 215 (H) 35 - 104 U/L    ALT 65 (H) 5 - 33 U/L    AST 38 (H) <32 U/L    Total Bilirubin 0.40 0.3 - 1.2 mg/dL    Total Protein 6.0 (L) 6.4 - 8.3 g/dL    Albumin 2.9 (L) 3.5 - 5.2 g/dL    Albumin/Globulin Ratio 0.9 (L) 1.0 - 2.5    GFR Non-African American >60 >60 mL/min    GFR African American >60 >60 mL/min    GFR Comment          GFR Staging         CBC with Auto Differential   Result Value Ref Range    WBC 11.1 3.5 - 11.3 k/uL    RBC 3.80 (L) 3.95 - 5.11 m/uL    Hemoglobin 11.6 (L) 11.9 - 15.1 g/dL    Hematocrit 35.6 (L) 36.3 - 47.1 %    MCV 93.7 82.6 - 102.9 fL    MCH 30.5 25.2 - 33.5 pg    MCHC 32.6 28.4 - 34.8 g/dL    RDW 12.8 11.8 - 14.4 %    Platelets 399 860 - 534 k/uL    MPV 11.0 8.1 - 13.5 fL    NRBC Automated 0.0 0.0 per 100 WBC    Seg Neutrophils 64 36 - 65 %    Lymphocytes 28 24 - 43 %    Monocytes 7 3 - 12 %    Eosinophils % 1 1 - 4 %    Basophils 0 0 - 2 %    Immature Granulocytes 0 0 %    Segs Absolute 7.05 1.50 - 8.10 k/uL    Absolute Lymph # 3.14 1.10 - 3.70 k/uL    Absolute Mono # 0.74 0.10 - 1.20 k/uL    Absolute Eos # 0.14 0.00 - 0.44 k/uL    Basophils Absolute 0.04 0.00 - 0.20 k/uL    Absolute Immature Granulocyte <0.03 0.00 - 0.30 k/uL         Postpartum complications: kidney and liver functions trending down    Discharge Medication:      Medication List      ASK your doctor about these medications    FISH OIL PO     PRENATAL VITAMIN PO     VITAMIN C ER PO     VITAMIN D-3 PO            Admit date: 3/10/2022  1:25 PM    Discharge Date: 3/12/2022    Discharged to: Home in stable condition        Plan:   Follow up  1 and 6 weeks postpartum, routine instructions

## 2022-03-14 ENCOUNTER — TELEPHONE (OUTPATIENT)
Dept: OBGYN CLINIC | Age: 32
End: 2022-03-14

## 2022-03-14 ENCOUNTER — HOSPITAL ENCOUNTER (OUTPATIENT)
Age: 32
Setting detail: SPECIMEN
Discharge: HOME | End: 2022-03-14

## 2022-03-14 DIAGNOSIS — R79.89 ELEVATED LIVER FUNCTION TESTS: ICD-10-CM

## 2022-03-14 DIAGNOSIS — R94.4 ABNORMAL RENAL FUNCTION TEST: ICD-10-CM

## 2022-03-14 DIAGNOSIS — R79.89 ELEVATED LIVER FUNCTION TESTS: Primary | ICD-10-CM

## 2022-03-14 LAB
ABSOLUTE EOS #: 0.1 K/UL (ref 0–0.44)
ABSOLUTE IMMATURE GRANULOCYTE: 0.03 K/UL (ref 0–0.3)
ABSOLUTE LYMPH #: 1.77 K/UL (ref 1.1–3.7)
ABSOLUTE MONO #: 0.56 K/UL (ref 0.1–1.2)
ALBUMIN SERPL-MCNC: 3.5 G/DL (ref 3.5–5.2)
ALBUMIN/GLOBULIN RATIO: 1.1 (ref 1–2.5)
ALP BLD-CCNC: 208 U/L (ref 35–104)
ALT SERPL-CCNC: 162 U/L (ref 5–33)
ANION GAP SERPL CALCULATED.3IONS-SCNC: 11 MMOL/L (ref 9–17)
AST SERPL-CCNC: 123 U/L
BASOPHILS # BLD: 0 % (ref 0–2)
BASOPHILS ABSOLUTE: 0.04 K/UL (ref 0–0.2)
BILIRUB SERPL-MCNC: 0.39 MG/DL (ref 0.3–1.2)
BUN BLDV-MCNC: 19 MG/DL (ref 6–20)
CALCIUM SERPL-MCNC: 8.9 MG/DL (ref 8.6–10.4)
CHLORIDE BLD-SCNC: 102 MMOL/L (ref 98–107)
CO2: 24 MMOL/L (ref 20–31)
CREAT SERPL-MCNC: 0.78 MG/DL (ref 0.5–0.9)
EOSINOPHILS RELATIVE PERCENT: 1 % (ref 1–4)
GFR AFRICAN AMERICAN: >60 ML/MIN
GFR NON-AFRICAN AMERICAN: >60 ML/MIN
GFR SERPL CREATININE-BSD FRML MDRD: ABNORMAL ML/MIN/{1.73_M2}
GLUCOSE BLD-MCNC: 81 MG/DL (ref 70–99)
HCT VFR BLD CALC: 39.8 % (ref 36.3–47.1)
HEMOGLOBIN: 12.9 G/DL (ref 11.9–15.1)
IMMATURE GRANULOCYTES: 0 %
LYMPHOCYTES # BLD: 17 % (ref 24–43)
MCH RBC QN AUTO: 30.4 PG (ref 25.2–33.5)
MCHC RBC AUTO-ENTMCNC: 32.4 G/DL (ref 28.4–34.8)
MCV RBC AUTO: 93.9 FL (ref 82.6–102.9)
MONOCYTES # BLD: 5 % (ref 3–12)
NRBC AUTOMATED: 0 PER 100 WBC
PDW BLD-RTO: 12.8 % (ref 11.8–14.4)
PLATELET # BLD: 314 K/UL (ref 138–453)
PMV BLD AUTO: 9.8 FL (ref 8.1–13.5)
POTASSIUM SERPL-SCNC: 4.5 MMOL/L (ref 3.7–5.3)
RBC # BLD: 4.24 M/UL (ref 3.95–5.11)
SEG NEUTROPHILS: 77 % (ref 36–65)
SEGMENTED NEUTROPHILS ABSOLUTE COUNT: 7.81 K/UL (ref 1.5–8.1)
SODIUM BLD-SCNC: 137 MMOL/L (ref 135–144)
TOTAL PROTEIN: 6.6 G/DL (ref 6.4–8.3)
WBC # BLD: 10.3 K/UL (ref 3.5–11.3)

## 2022-03-15 ENCOUNTER — TELEPHONE (OUTPATIENT)
Dept: OBGYN CLINIC | Age: 32
End: 2022-03-15

## 2022-03-15 DIAGNOSIS — R79.89 ELEVATED LIVER FUNCTION TESTS: Primary | ICD-10-CM

## 2022-03-15 LAB — SURGICAL PATHOLOGY REPORT: NORMAL

## 2022-03-15 NOTE — TELEPHONE ENCOUNTER
Spoke with patient and she is not taking Tylenol but only ibuprofen for the 1st day home but nothing now. Pt was wondering if okay to breastfeed and per CONNIE Gee okay to do this. Pt stated she has been taking protein power and shakes but has been taking her whole pregnancy. Pt was advised to stop taking this since her levels where high. Pt was told okay to eat eggs or foods that naturally have protein but no supplements. Pt stated that her face was a little yellow but the rest of her looked okay.

## 2022-03-17 ENCOUNTER — HOSPITAL ENCOUNTER (OUTPATIENT)
Age: 32
Setting detail: SPECIMEN
Discharge: HOME | End: 2022-03-17

## 2022-03-17 ENCOUNTER — OFFICE VISIT (OUTPATIENT)
Dept: OBGYN CLINIC | Age: 32
End: 2022-03-17
Payer: COMMERCIAL

## 2022-03-17 VITALS
WEIGHT: 137 LBS | BODY MASS INDEX: 22.82 KG/M2 | HEIGHT: 65 IN | SYSTOLIC BLOOD PRESSURE: 144 MMHG | DIASTOLIC BLOOD PRESSURE: 80 MMHG

## 2022-03-17 VITALS — WEIGHT: 137.4 LBS | BODY MASS INDEX: 22.86 KG/M2 | SYSTOLIC BLOOD PRESSURE: 144 MMHG | DIASTOLIC BLOOD PRESSURE: 80 MMHG

## 2022-03-17 DIAGNOSIS — Z01.30 BLOOD PRESSURE CHECK: Primary | ICD-10-CM

## 2022-03-17 DIAGNOSIS — R79.89 ELEVATED LIVER FUNCTION TESTS: ICD-10-CM

## 2022-03-17 DIAGNOSIS — R03.0 ELEVATED BLOOD PRESSURE READING: ICD-10-CM

## 2022-03-17 DIAGNOSIS — R79.89 ELEVATED LIVER FUNCTION TESTS: Primary | ICD-10-CM

## 2022-03-17 LAB
ABSOLUTE EOS #: 0.12 K/UL (ref 0–0.44)
ABSOLUTE IMMATURE GRANULOCYTE: 0.03 K/UL (ref 0–0.3)
ABSOLUTE LYMPH #: 1.66 K/UL (ref 1.1–3.7)
ABSOLUTE MONO #: 0.58 K/UL (ref 0.1–1.2)
ALBUMIN SERPL-MCNC: 3.7 G/DL (ref 3.5–5.2)
ALBUMIN/GLOBULIN RATIO: 1.2 (ref 1–2.5)
ALP BLD-CCNC: 170 U/L (ref 35–104)
ALT SERPL-CCNC: 83 U/L (ref 5–33)
ANION GAP SERPL CALCULATED.3IONS-SCNC: 14 MMOL/L (ref 9–17)
AST SERPL-CCNC: 40 U/L
BASOPHILS # BLD: 1 % (ref 0–2)
BASOPHILS ABSOLUTE: 0.04 K/UL (ref 0–0.2)
BILIRUB SERPL-MCNC: 0.52 MG/DL (ref 0.3–1.2)
BUN BLDV-MCNC: 13 MG/DL (ref 6–20)
CALCIUM SERPL-MCNC: 9 MG/DL (ref 8.6–10.4)
CHLORIDE BLD-SCNC: 103 MMOL/L (ref 98–107)
CO2: 23 MMOL/L (ref 20–31)
CREAT SERPL-MCNC: 0.72 MG/DL (ref 0.5–0.9)
EOSINOPHILS RELATIVE PERCENT: 2 % (ref 1–4)
GFR AFRICAN AMERICAN: >60 ML/MIN
GFR NON-AFRICAN AMERICAN: >60 ML/MIN
GFR SERPL CREATININE-BSD FRML MDRD: ABNORMAL ML/MIN/{1.73_M2}
GLUCOSE BLD-MCNC: 64 MG/DL (ref 70–99)
HCT VFR BLD CALC: 40.6 % (ref 36.3–47.1)
HEMOGLOBIN: 12.9 G/DL (ref 11.9–15.1)
IMMATURE GRANULOCYTES: 0 %
LYMPHOCYTES # BLD: 22 % (ref 24–43)
MCH RBC QN AUTO: 30.1 PG (ref 25.2–33.5)
MCHC RBC AUTO-ENTMCNC: 31.8 G/DL (ref 28.4–34.8)
MCV RBC AUTO: 94.6 FL (ref 82.6–102.9)
MONOCYTES # BLD: 8 % (ref 3–12)
NRBC AUTOMATED: 0 PER 100 WBC
PDW BLD-RTO: 12.7 % (ref 11.8–14.4)
PLATELET # BLD: 379 K/UL (ref 138–453)
PMV BLD AUTO: 9.1 FL (ref 8.1–13.5)
POTASSIUM SERPL-SCNC: 4.4 MMOL/L (ref 3.7–5.3)
RBC # BLD: 4.29 M/UL (ref 3.95–5.11)
SEG NEUTROPHILS: 67 % (ref 36–65)
SEGMENTED NEUTROPHILS ABSOLUTE COUNT: 5.3 K/UL (ref 1.5–8.1)
SODIUM BLD-SCNC: 140 MMOL/L (ref 135–144)
TOTAL PROTEIN: 6.7 G/DL (ref 6.4–8.3)
WBC # BLD: 7.7 K/UL (ref 3.5–11.3)

## 2022-03-17 PROCEDURE — 99213 OFFICE O/P EST LOW 20 MIN: CPT | Performed by: ADVANCED PRACTICE MIDWIFE

## 2022-03-17 RX ORDER — LABETALOL 100 MG/1
100 TABLET, FILM COATED ORAL DAILY
Qty: 30 TABLET | Refills: 0 | Status: SHIPPED | OUTPATIENT
Start: 2022-03-17 | End: 2022-07-05 | Stop reason: ALTCHOICE

## 2022-03-17 ASSESSMENT — ENCOUNTER SYMPTOMS: RESPIRATORY NEGATIVE: 1

## 2022-03-17 NOTE — PROGRESS NOTES
PROBLEM VISIT     Date of service: 3/17/2022    Elliott Walton  Is a 28 y.o.  female    PT's PCP is: No primary care provider on file. : 1990                                          Review of Systems   Constitutional: Negative. HENT: Negative. Respiratory: Negative. Cardiovascular: Negative. Genitourinary: Positive for vaginal bleeding (light lochia). Neurological: Negative for dizziness and headaches. Psychiatric/Behavioral: Negative. Patient's last menstrual period was 2021 (exact date). OB History    Para Term  AB Living   2 2 2     2   SAB IAB Ectopic Molar Multiple Live Births           0 2      # Outcome Date GA Lbr Reginald/2nd Weight Sex Delivery Anes PTL Lv   2 Term 03/10/22 38w4d 05:44 / 00:18 7 lb 9.5 oz (3.445 kg) M Vag-Spont EPI N FILIPPO   1 Term 07/24/15 40w0d  7 lb 7 oz (3.374 kg) M Vag-Spont EPI N FILIPPO        Social History     Tobacco Use   Smoking Status Never Smoker   Smokeless Tobacco Never Used        Social History     Substance and Sexual Activity   Alcohol Use Not Currently       Allergies: Ceclor [cefaclor]      Current Outpatient Medications:     labetalol (NORMODYNE) 100 MG tablet, Take 1 tablet by mouth daily, Disp: 30 tablet, Rfl: 0    Cholecalciferol (VITAMIN D-3 PO), Take 1 tablet by mouth daily, Disp: , Rfl:     Prenatal Vit-Fe Fumarate-FA (PRENATAL VITAMIN PO), Take by mouth, Disp: , Rfl:     Omega-3 Fatty Acids (FISH OIL PO), Take by mouth, Disp: , Rfl:     Ascorbic Acid (VITAMIN C ER PO), Take by mouth , Disp: , Rfl:     Social History     Substance and Sexual Activity   Sexual Activity Yes    Partners: Male       Chief Complaint   Patient presents with    Blood Pressure Check     Pt here to discuss labs and BP. Physical Exam  Constitutional:       Appearance: Normal appearance. She is normal weight. HENT:      Head: Normocephalic.    Eyes:      Conjunctiva/sclera: Conjunctivae normal.      Pupils: Pupils are equal, round, and reactive to light. Pulmonary:      Effort: Pulmonary effort is normal.   Musculoskeletal:         General: Normal range of motion. Cervical back: Normal range of motion. Neurological:      Mental Status: She is alert and oriented to person, place, and time. Skin:     General: Skin is warm and dry. Comments: No yellowing noted    Psychiatric:         Behavior: Behavior normal.   Vitals and nursing note reviewed. Vital Signs  Blood pressure (!) 144/80, height 5' 5\" (1.651 m), weight 137 lb (62.1 kg), last menstrual period 06/13/2021, unknown if currently breastfeeding. HPI Patient here for 1 week BP check. Also planning repeat labs today. Reports doing well overall - sleeping 4 hour intervals at night, nursing is going well. Lochia scant. Denies headache, vision changes. Still some RUQ discomfort intermittently with cough, sneeze, stretch. Results reviewed today:    Reviewed recent labs - while in hospital LFT's were trending downward. Most recent check - BUN/Creat returned to normal but LFT's again increased. Assessment and Plan          Diagnosis Orders   1. Elevated liver function tests     2. Elevated blood pressure reading       Start Labetalol 100mg pO daily for elevated BP x 2 today - repeat BP check in 1 week and call with further changes/concerns    If LFT's remain elevated will order CT scan and refer to GI - patient agreeable    Dr Marquis Chew updated and agreeable with plan        I am having Jatinder Skill start on labetalol. I am also having her maintain her Prenatal Vit-Fe Fumarate-FA (PRENATAL VITAMIN PO), Omega-3 Fatty Acids (FISH OIL PO), Ascorbic Acid (VITAMIN C ER PO), and Cholecalciferol (VITAMIN D-3 PO). No follow-ups on file. She was also counseled on her preventative health maintenance recommendations and follow-up. There are no Patient Instructions on file for this visit.     ALEX THOMAS MAEVE WILLIS CNM,3/17/2022 9:45 AM                   Electronically signed by MAEVE Mccoy CNM

## 2022-03-17 NOTE — PROGRESS NOTES
Here for PP blood pressure check and labs. /80. B Gerard aware and will recheck BP in 10 min. BP recheck 144/80. B Gerard in to speak with pt.

## 2022-03-24 ENCOUNTER — OFFICE VISIT (OUTPATIENT)
Dept: OBGYN CLINIC | Age: 32
End: 2022-03-24
Payer: COMMERCIAL

## 2022-03-24 ENCOUNTER — HOSPITAL ENCOUNTER (OUTPATIENT)
Age: 32
Setting detail: SPECIMEN
Discharge: HOME | End: 2022-03-24

## 2022-03-24 VITALS
HEIGHT: 65 IN | DIASTOLIC BLOOD PRESSURE: 76 MMHG | BODY MASS INDEX: 22.29 KG/M2 | SYSTOLIC BLOOD PRESSURE: 110 MMHG | WEIGHT: 133.8 LBS

## 2022-03-24 DIAGNOSIS — Z01.30 BLOOD PRESSURE CHECK: Primary | ICD-10-CM

## 2022-03-24 DIAGNOSIS — Z01.30 BLOOD PRESSURE CHECK: ICD-10-CM

## 2022-03-24 DIAGNOSIS — R79.89 ELEVATED LIVER FUNCTION TESTS: ICD-10-CM

## 2022-03-24 LAB
ABSOLUTE EOS #: 0.09 K/UL (ref 0–0.44)
ABSOLUTE IMMATURE GRANULOCYTE: 0.03 K/UL (ref 0–0.3)
ABSOLUTE LYMPH #: 1.8 K/UL (ref 1.1–3.7)
ABSOLUTE MONO #: 0.34 K/UL (ref 0.1–1.2)
ALBUMIN SERPL-MCNC: 4.2 G/DL (ref 3.5–5.2)
ALBUMIN/GLOBULIN RATIO: 1.2 (ref 1–2.5)
ALP BLD-CCNC: 126 U/L (ref 35–104)
ALT SERPL-CCNC: 32 U/L (ref 5–33)
ANION GAP SERPL CALCULATED.3IONS-SCNC: 13 MMOL/L (ref 9–17)
AST SERPL-CCNC: 24 U/L
BASOPHILS # BLD: 1 % (ref 0–2)
BASOPHILS ABSOLUTE: 0.06 K/UL (ref 0–0.2)
BILIRUB SERPL-MCNC: 0.79 MG/DL (ref 0.3–1.2)
BUN BLDV-MCNC: 19 MG/DL (ref 6–20)
CALCIUM SERPL-MCNC: 10.1 MG/DL (ref 8.6–10.4)
CHLORIDE BLD-SCNC: 96 MMOL/L (ref 98–107)
CO2: 24 MMOL/L (ref 20–31)
CREAT SERPL-MCNC: 0.83 MG/DL (ref 0.5–0.9)
EOSINOPHILS RELATIVE PERCENT: 1 % (ref 1–4)
GFR AFRICAN AMERICAN: >60 ML/MIN
GFR NON-AFRICAN AMERICAN: >60 ML/MIN
GFR SERPL CREATININE-BSD FRML MDRD: ABNORMAL ML/MIN/{1.73_M2}
GLUCOSE BLD-MCNC: 59 MG/DL (ref 70–99)
HCT VFR BLD CALC: 41.4 % (ref 36.3–47.1)
HEMOGLOBIN: 13.4 G/DL (ref 11.9–15.1)
IMMATURE GRANULOCYTES: 0 %
LYMPHOCYTES # BLD: 20 % (ref 24–43)
MCH RBC QN AUTO: 30.7 PG (ref 25.2–33.5)
MCHC RBC AUTO-ENTMCNC: 32.4 G/DL (ref 28.4–34.8)
MCV RBC AUTO: 94.7 FL (ref 82.6–102.9)
MONOCYTES # BLD: 4 % (ref 3–12)
NRBC AUTOMATED: 0 PER 100 WBC
PDW BLD-RTO: 12.2 % (ref 11.8–14.4)
PLATELET # BLD: 398 K/UL (ref 138–453)
PMV BLD AUTO: 8.7 FL (ref 8.1–13.5)
POTASSIUM SERPL-SCNC: 4.2 MMOL/L (ref 3.7–5.3)
RBC # BLD: 4.37 M/UL (ref 3.95–5.11)
SEG NEUTROPHILS: 74 % (ref 36–65)
SEGMENTED NEUTROPHILS ABSOLUTE COUNT: 6.84 K/UL (ref 1.5–8.1)
SODIUM BLD-SCNC: 133 MMOL/L (ref 135–144)
TOTAL PROTEIN: 7.7 G/DL (ref 6.4–8.3)
WBC # BLD: 9.2 K/UL (ref 3.5–11.3)

## 2022-03-24 PROCEDURE — 99213 OFFICE O/P EST LOW 20 MIN: CPT | Performed by: ADVANCED PRACTICE MIDWIFE

## 2022-03-24 ASSESSMENT — ENCOUNTER SYMPTOMS
ABDOMINAL PAIN: 1
BLOOD IN STOOL: 0
RESPIRATORY NEGATIVE: 1
CONSTIPATION: 0
DIARRHEA: 0

## 2022-03-24 NOTE — PROGRESS NOTES
PROBLEM VISIT     Date of service: 3/24/2022    Guerda Coleman  Is a 28 y.o.  female    PT's PCP is: No primary care provider on file. : 1990                                          Review of Systems   Constitutional: Negative. HENT: Negative. Respiratory: Negative. Cardiovascular: Negative. Gastrointestinal: Positive for abdominal pain (intermittent RUQ pain). Negative for blood in stool, constipation and diarrhea. Neurological: Negative. Psychiatric/Behavioral: Negative. Patient's last menstrual period was 2021 (exact date). OB History    Para Term  AB Living   2 2 2     2   SAB IAB Ectopic Molar Multiple Live Births           0 2      # Outcome Date GA Lbr Reginald/2nd Weight Sex Delivery Anes PTL Lv   2 Term 03/10/22 38w4d 05:44 / 00:18 7 lb 9.5 oz (3.445 kg) M Vag-Spont EPI N FILIPPO   1 Term 07/24/15 40w0d  7 lb 7 oz (3.374 kg) M Vag-Spont EPI N FILIPPO        Social History     Tobacco Use   Smoking Status Never Smoker   Smokeless Tobacco Never Used        Social History     Substance and Sexual Activity   Alcohol Use Not Currently       Allergies: Ceclor [cefaclor]      Current Outpatient Medications:     labetalol (NORMODYNE) 100 MG tablet, Take 1 tablet by mouth daily, Disp: 30 tablet, Rfl: 0    Cholecalciferol (VITAMIN D-3 PO), Take 1 tablet by mouth daily, Disp: , Rfl:     Prenatal Vit-Fe Fumarate-FA (PRENATAL VITAMIN PO), Take by mouth, Disp: , Rfl:     Omega-3 Fatty Acids (FISH OIL PO), Take by mouth, Disp: , Rfl:     Ascorbic Acid (VITAMIN C ER PO), Take by mouth , Disp: , Rfl:     Social History     Substance and Sexual Activity   Sexual Activity Yes    Partners: Male       Chief Complaint   Patient presents with    Blood Pressure Check     Pt states doing well. Denies concerns. Physical Exam  Constitutional:       Appearance: Normal appearance. She is normal weight. HENT:      Head: Normocephalic.    Eyes:      Pupils: Pupils are equal, round, and reactive to light. Pulmonary:      Effort: Pulmonary effort is normal.   Musculoskeletal:         General: Normal range of motion. Cervical back: Normal range of motion. Neurological:      Mental Status: She is alert and oriented to person, place, and time. Skin:     General: Skin is warm and dry. Psychiatric:         Behavior: Behavior normal.   Vitals and nursing note reviewed. Vital Signs  Blood pressure 110/76, height 5' 5\" (1.651 m), weight 133 lb 12.8 oz (60.7 kg), last menstrual period 06/13/2021, unknown if currently breastfeeding. HPI Here for repeat eval since starting labetalol. Matt has been taking BP medication at home in mornings and also monitoring BP. Reports pressures - with wrist cuff - are normal and average [id number] systolic. Denies HA, vision changes, epigastric pain. Does still have some RUQ pain intermittently. Continues to lactate. Reports first 2 days of labetalol had some \"feeling funny\" - shakes and \"just off\". Planning repeat labs today    Results reviewed today:    Trends in CMP reviewed in epic                              Assessment and Plan          Diagnosis Orders   1. Blood pressure check  CBC with Auto Differential    Comprehensive Metabolic Panel   2. Elevated liver function tests  CBC with Auto Differential    Comprehensive Metabolic Panel       Discussed continue labetalol - does not have PCP - decrease to 50mg daily, continue to monitor BP and if after 1 week still doing well okay to stop medication. Encouraged to get PCP. Discussed will see what labs show then likely order USN of RUQ due to continued discomfort intermittently - she is agreeable      I am having Nai Felix maintain her Prenatal Vit-Fe Fumarate-FA (PRENATAL VITAMIN PO), Omega-3 Fatty Acids (FISH OIL PO), Ascorbic Acid (VITAMIN C ER PO), Cholecalciferol (VITAMIN D-3 PO), and labetalol. No follow-ups on file.     She was also counseled on her preventative health maintenance recommendations and follow-up. There are no Patient Instructions on file for this visit.     MAEVE Casas CNM,3/24/2022 1:06 PM                   Electronically signed by MAEVE Casas CNM

## 2022-03-28 DIAGNOSIS — R79.89 ABNORMAL LFTS (LIVER FUNCTION TESTS): ICD-10-CM

## 2022-03-28 DIAGNOSIS — R10.11 RIGHT UPPER QUADRANT ABDOMINAL PAIN: Primary | ICD-10-CM

## 2022-03-28 DIAGNOSIS — Z87.59 HISTORY OF PRE-ECLAMPSIA: ICD-10-CM

## 2022-04-14 ENCOUNTER — TELEPHONE (OUTPATIENT)
Dept: OBGYN CLINIC | Age: 32
End: 2022-04-14

## 2022-04-14 NOTE — TELEPHONE ENCOUNTER
Spoke with patient and she scheduled appt last night to have imaging done on 4/21/22. Pt stated it was hard to schedule appt because they will not let her bring in the baby. Pt stated she will come in and have labs drawn at her appt on 4/28/22.

## 2022-04-19 ENCOUNTER — TELEPHONE (OUTPATIENT)
Dept: OBGYN CLINIC | Age: 32
End: 2022-04-19

## 2022-04-19 DIAGNOSIS — R79.89 ELEVATED LIVER FUNCTION TESTS: Primary | ICD-10-CM

## 2022-04-19 DIAGNOSIS — R94.4 ABNORMAL RENAL FUNCTION TEST: ICD-10-CM

## 2022-04-19 DIAGNOSIS — R10.11 RIGHT UPPER QUADRANT ABDOMINAL PAIN: ICD-10-CM

## 2022-04-19 NOTE — TELEPHONE ENCOUNTER
Pt called c/o Upper right abdominal pain that just started. Pt was wondering if it was okay to have labs drawn today or tomorrow. Her USN is scheduled for Thursday. Per CONNIE Gee okay to order labs. No  today so can go to [facility] or [facility] or come in tomorrow for labs. Pt aware and will come in tomorrow for lab draw.

## 2022-04-20 ENCOUNTER — HOSPITAL ENCOUNTER (OUTPATIENT)
Age: 32
Setting detail: SPECIMEN
Discharge: HOME | End: 2022-04-20

## 2022-04-20 DIAGNOSIS — R10.11 RIGHT UPPER QUADRANT ABDOMINAL PAIN: ICD-10-CM

## 2022-04-20 DIAGNOSIS — R79.89 ELEVATED LIVER FUNCTION TESTS: ICD-10-CM

## 2022-04-20 DIAGNOSIS — R94.4 ABNORMAL RENAL FUNCTION TEST: ICD-10-CM

## 2022-04-20 LAB
ABSOLUTE EOS #: 0.2 K/UL (ref 0–0.44)
ABSOLUTE IMMATURE GRANULOCYTE: <0.03 K/UL (ref 0–0.3)
ABSOLUTE LYMPH #: 1.73 K/UL (ref 1.1–3.7)
ABSOLUTE MONO #: 0.45 K/UL (ref 0.1–1.2)
ALBUMIN SERPL-MCNC: 4.8 G/DL (ref 3.5–5.2)
ALBUMIN/GLOBULIN RATIO: 1.7 (ref 1–2.5)
ALP BLD-CCNC: 72 U/L (ref 35–104)
ALT SERPL-CCNC: 17 U/L (ref 5–33)
AMYLASE: 59 U/L (ref 28–100)
ANION GAP SERPL CALCULATED.3IONS-SCNC: 12 MMOL/L (ref 9–17)
AST SERPL-CCNC: 18 U/L
BASOPHILS # BLD: 0 % (ref 0–2)
BASOPHILS ABSOLUTE: 0.03 K/UL (ref 0–0.2)
BILIRUB SERPL-MCNC: 0.62 MG/DL (ref 0.3–1.2)
BUN BLDV-MCNC: 19 MG/DL (ref 6–20)
CALCIUM SERPL-MCNC: 9.9 MG/DL (ref 8.6–10.4)
CHLORIDE BLD-SCNC: 98 MMOL/L (ref 98–107)
CO2: 26 MMOL/L (ref 20–31)
CREAT SERPL-MCNC: 0.84 MG/DL (ref 0.5–0.9)
EOSINOPHILS RELATIVE PERCENT: 3 % (ref 1–4)
GFR AFRICAN AMERICAN: >60 ML/MIN
GFR NON-AFRICAN AMERICAN: >60 ML/MIN
GFR SERPL CREATININE-BSD FRML MDRD: ABNORMAL ML/MIN/{1.73_M2}
GLUCOSE BLD-MCNC: 63 MG/DL (ref 70–99)
HCT VFR BLD CALC: 39.5 % (ref 36.3–47.1)
HEMOGLOBIN: 12.9 G/DL (ref 11.9–15.1)
IMMATURE GRANULOCYTES: 0 %
LIPASE: 30 U/L (ref 13–60)
LYMPHOCYTES # BLD: 24 % (ref 24–43)
MCH RBC QN AUTO: 30.7 PG (ref 25.2–33.5)
MCHC RBC AUTO-ENTMCNC: 32.7 G/DL (ref 28.4–34.8)
MCV RBC AUTO: 94 FL (ref 82.6–102.9)
MONOCYTES # BLD: 6 % (ref 3–12)
NRBC AUTOMATED: 0 PER 100 WBC
PDW BLD-RTO: 11.9 % (ref 11.8–14.4)
PLATELET # BLD: 250 K/UL (ref 138–453)
PMV BLD AUTO: 9.3 FL (ref 8.1–13.5)
POTASSIUM SERPL-SCNC: 4.2 MMOL/L (ref 3.7–5.3)
RBC # BLD: 4.2 M/UL (ref 3.95–5.11)
SEG NEUTROPHILS: 67 % (ref 36–65)
SEGMENTED NEUTROPHILS ABSOLUTE COUNT: 4.66 K/UL (ref 1.5–8.1)
SODIUM BLD-SCNC: 136 MMOL/L (ref 135–144)
TOTAL PROTEIN: 7.6 G/DL (ref 6.4–8.3)
WBC # BLD: 7.1 K/UL (ref 3.5–11.3)

## 2022-04-21 ENCOUNTER — HOSPITAL ENCOUNTER (OUTPATIENT)
Dept: ULTRASOUND IMAGING | Age: 32
Discharge: HOME | End: 2022-04-23
Payer: COMMERCIAL

## 2022-04-21 DIAGNOSIS — Z87.59 HISTORY OF PRE-ECLAMPSIA: ICD-10-CM

## 2022-04-21 DIAGNOSIS — R79.89 ABNORMAL LFTS (LIVER FUNCTION TESTS): ICD-10-CM

## 2022-04-21 DIAGNOSIS — R10.11 RIGHT UPPER QUADRANT ABDOMINAL PAIN: ICD-10-CM

## 2022-04-21 PROCEDURE — 76705 ECHO EXAM OF ABDOMEN: CPT

## 2022-04-28 ENCOUNTER — POSTPARTUM VISIT (OUTPATIENT)
Dept: OBGYN CLINIC | Age: 32
End: 2022-04-28
Payer: COMMERCIAL

## 2022-04-28 VITALS
HEIGHT: 65 IN | BODY MASS INDEX: 22.13 KG/M2 | DIASTOLIC BLOOD PRESSURE: 52 MMHG | SYSTOLIC BLOOD PRESSURE: 98 MMHG | WEIGHT: 132.8 LBS

## 2022-04-28 NOTE — PROGRESS NOTES
POSTPARTUM EXAM    Date of service: 2022    Min Mackey  Is a 28 y.o.  female    PT's PCP is: No primary care provider on file. : 1990     OB History    Para Term  AB Living   2 2 2     2   SAB IAB Ectopic Molar Multiple Live Births           0 2      # Outcome Date GA Lbr Reginald/2nd Weight Sex Delivery Anes PTL Lv   2 Term 03/10/22 38w4d 05:44 / 00:18 7 lb 9.5 oz (3.445 kg) M Vag-Spont EPI N FILIPPO   1 Term 07/24/15 40w0d  7 lb 7 oz (3.374 kg) M Vag-Spont EPI N FILIPPO        Social History     Tobacco Use   Smoking Status Never Smoker   Smokeless Tobacco Never Used         Social History     Substance and Sexual Activity   Alcohol Use Not Currently         Delivery date 3/10/2022    Type of delivery:  Spontaneous vaginal delivery    Laceration:Yes, First degree laceration. Infant gender: male    Infant name: Jc    Are you breast or bottle feeding? Breast    Have you been sexually active since delivery? No    Vital Signs: Blood pressure (!) 98/52, height 5' 5\" (1.651 m), weight 132 lb 12.8 oz (60.2 kg), last menstrual period 2021, unknown if currently breastfeeding. Labs:    Blood Type and Rh: A NEGATIVE          Allergies: Ceclor [cefaclor]      Current Outpatient Medications:     labetalol (NORMODYNE) 100 MG tablet, Take 1 tablet by mouth daily, Disp: 30 tablet, Rfl: 0    Prenatal Vit-Fe Fumarate-FA (PRENATAL VITAMIN PO), Take by mouth, Disp: , Rfl:     Omega-3 Fatty Acids (FISH OIL PO), Take by mouth, Disp: , Rfl:     Ascorbic Acid (VITAMIN C ER PO), Take by mouth , Disp: , Rfl:       Chief Complaint   Patient presents with    Postpartum Care     PPV. 1st degree tear. Breastfeeding. Pt stopped bleeding about 2 weeks PP. Pt has not resumed cycles. Pt has not resumed intercourse.         How many Hours of sleep do you get a night: Infant sleeps 7 hour stretch at night    Do you have a normal appetite: yes    Any problems or pain: occ pain in RUQ - area AM

## 2022-07-05 ENCOUNTER — OFFICE VISIT (OUTPATIENT)
Dept: OBGYN CLINIC | Age: 32
End: 2022-07-05
Payer: COMMERCIAL

## 2022-07-05 ENCOUNTER — HOSPITAL ENCOUNTER (OUTPATIENT)
Age: 32
Setting detail: SPECIMEN
Discharge: HOME | End: 2022-07-05

## 2022-07-05 VITALS
HEIGHT: 65 IN | BODY MASS INDEX: 21.33 KG/M2 | WEIGHT: 128 LBS | SYSTOLIC BLOOD PRESSURE: 90 MMHG | DIASTOLIC BLOOD PRESSURE: 60 MMHG

## 2022-07-05 DIAGNOSIS — Z01.419 SMEAR, VAGINAL, AS PART OF ROUTINE GYNECOLOGICAL EXAMINATION: Primary | ICD-10-CM

## 2022-07-05 DIAGNOSIS — Z12.4 SCREENING FOR CERVICAL CANCER: ICD-10-CM

## 2022-07-05 DIAGNOSIS — Z12.72 SMEAR, VAGINAL, AS PART OF ROUTINE GYNECOLOGICAL EXAMINATION: Primary | ICD-10-CM

## 2022-07-05 PROCEDURE — 99395 PREV VISIT EST AGE 18-39: CPT | Performed by: ADVANCED PRACTICE MIDWIFE

## 2022-07-05 ASSESSMENT — ENCOUNTER SYMPTOMS
CONSTIPATION: 0
ABDOMINAL PAIN: 0
RESPIRATORY NEGATIVE: 1
SHORTNESS OF BREATH: 0
GASTROINTESTINAL NEGATIVE: 1
DIARRHEA: 0

## 2022-07-05 NOTE — PROGRESS NOTES
YEARLY PHYSICAL    Date of service: 2022    Venus Ritter  Is a 28 y.o.   female    PT's PCP is: No primary care provider on file. : 1990                                             Subjective:       No LMP recorded.      Are your menses regular: no menses since delivery    OB History    Para Term  AB Living   2 2 2     2   SAB IAB Ectopic Molar Multiple Live Births           0 2      # Outcome Date GA Lbr Reginald/2nd Weight Sex Delivery Anes PTL Lv   2 Term 03/10/22 38w4d 05:44 / 00:18 7 lb 9.5 oz (3.445 kg) M Vag-Spont EPI N FILIPPO   1 Term 07/24/15 40w0d  7 lb 7 oz (3.374 kg) M Vag-Spont EPI N FILIPPO        Social History     Tobacco Use   Smoking Status Never Smoker   Smokeless Tobacco Never Used        Social History     Substance and Sexual Activity   Alcohol Use Yes    Comment: rare       Family History   Problem Relation Age of Onset    Alzheimer's Disease Maternal Grandmother     No Known Problems Father     No Known Problems Paternal Grandfather     No Known Problems Paternal Grandmother     No Known Problems Maternal Grandfather     No Known Problems Mother     No Known Problems Brother     No Known Problems Sister        Any family history of breast or ovarian cancer: No    Any family history of blood clots: No      Allergies: Ceclor [cefaclor]      Current Outpatient Medications:     Prenatal Vit-Fe Fumarate-FA (PRENATAL VITAMIN PO), Take by mouth, Disp: , Rfl:     Omega-3 Fatty Acids (FISH OIL PO), Take by mouth, Disp: , Rfl:     Ascorbic Acid (VITAMIN C ER PO), Take by mouth , Disp: , Rfl:     Social History     Substance and Sexual Activity   Sexual Activity Yes    Partners: Male       Any bleeding or pain with intercourse: No    Last Yearly:   with pregnancy    Last pap: Due    Do you do self breast exams: Encouraged    Past Medical History:   Diagnosis Date    Heart abnormality     elevated blood pressure    Pre-eclampsia in third trimester        History reviewed. No pertinent surgical history. Family History   Problem Relation Age of Onset    Alzheimer's Disease Maternal Grandmother     No Known Problems Father     No Known Problems Paternal Grandfather     No Known Problems Paternal Grandmother     No Known Problems Maternal Grandfather     No Known Problems Mother     No Known Problems Brother     No Known Problems Sister        Chief Complaint   Patient presents with    Annual Exam     Pap due. Pt denies concerns. Labs:    No results found for this visit on 07/05/22. HPI: Annual.  Continues to lactate. Denies breast/pelvic concerns. No menses since delivery. Due for pap smear. Monogamous relationship. Spouse planning a vasectomy    Review of Systems   Constitutional: Negative. Negative for chills, fatigue and fever. HENT: Negative. Respiratory: Negative. Negative for shortness of breath. Cardiovascular: Negative. Negative for chest pain. Gastrointestinal: Negative. Negative for abdominal pain, constipation and diarrhea. Genitourinary: Negative for dysuria, enuresis, frequency, menstrual problem, pelvic pain, urgency and vaginal bleeding. Musculoskeletal: Negative. Neurological: Negative. Negative for dizziness, light-headedness and headaches. Psychiatric/Behavioral: Negative. Objective  Blood pressure 90/60, height 5' 5\" (1.651 m), weight 128 lb (58.1 kg), currently breastfeeding. Physical Exam  Constitutional:       Appearance: Normal appearance. She is normal weight. Genitourinary:      Vulva, bladder and urethral meatus normal.      No vaginal discharge or tenderness. No vaginal prolapse present. Right Adnexa: not tender. Left Adnexa: not tender. No cervical motion tenderness or discharge. Uterus is not tender. Pelvic exam was performed with patient in the lithotomy position.    Breasts: Breasts are recommendations and follow-up. There are no Patient Instructions on file for this visit.     [address] 10:54 AM

## 2022-07-06 LAB
HPV SAMPLE: NORMAL
HPV, GENOTYPE 16: NOT DETECTED
HPV, GENOTYPE 18: NOT DETECTED
HPV, HIGH RISK OTHER: NOT DETECTED
HPV, INTERPRETATION: NORMAL
SPECIMEN DESCRIPTION: NORMAL

## 2022-07-11 LAB — CYTOLOGY REPORT: NORMAL

## 2023-07-10 ENCOUNTER — OFFICE VISIT (OUTPATIENT)
Dept: OBGYN CLINIC | Age: 33
End: 2023-07-10
Payer: COMMERCIAL

## 2023-07-10 VITALS
SYSTOLIC BLOOD PRESSURE: 118 MMHG | BODY MASS INDEX: 22.02 KG/M2 | HEIGHT: 65 IN | WEIGHT: 132.2 LBS | DIASTOLIC BLOOD PRESSURE: 60 MMHG

## 2023-07-10 DIAGNOSIS — Z12.72 SMEAR, VAGINAL, AS PART OF ROUTINE GYNECOLOGICAL EXAMINATION: Primary | ICD-10-CM

## 2023-07-10 DIAGNOSIS — Z01.419 SMEAR, VAGINAL, AS PART OF ROUTINE GYNECOLOGICAL EXAMINATION: Primary | ICD-10-CM

## 2023-07-10 PROCEDURE — 99395 PREV VISIT EST AGE 18-39: CPT | Performed by: ADVANCED PRACTICE MIDWIFE

## 2023-07-10 ASSESSMENT — ENCOUNTER SYMPTOMS
GASTROINTESTINAL NEGATIVE: 1
ABDOMINAL PAIN: 0
RESPIRATORY NEGATIVE: 1
SHORTNESS OF BREATH: 0
CONSTIPATION: 0
DIARRHEA: 0

## 2023-07-10 NOTE — PROGRESS NOTES
YEARLY PHYSICAL    Date of service: 7/10/2023    Carolynn Allen  Is a 33 y.o.   female    PT's PCP is: No primary care provider on file. : 1990                                             Subjective:       Patient's last menstrual period was 2023 (exact date).      Are your menses regular: yes    OB History    Para Term  AB Living   2 2 2     2   SAB IAB Ectopic Molar Multiple Live Births           0 2      # Outcome Date GA Lbr Reginald/2nd Weight Sex Delivery Anes PTL Lv   2 Term 03/10/22 38w4d 05:44 / 00:18 7 lb 9.5 oz (3.445 kg) M Vag-Spont EPI N FILIPPO   1 Term 07/24/15 40w0d  7 lb 7 oz (3.374 kg) M Vag-Spont EPI N FILIPPO        Social History     Tobacco Use   Smoking Status Never   Smokeless Tobacco Never        Social History     Substance and Sexual Activity   Alcohol Use Yes    Comment: rare       Family History   Problem Relation Age of Onset    Alzheimer's Disease Maternal Grandmother     No Known Problems Father     No Known Problems Paternal Grandfather     No Known Problems Paternal Grandmother     No Known Problems Maternal Grandfather     No Known Problems Mother     No Known Problems Brother     No Known Problems Sister        Any family history of breast or ovarian cancer: No    Any family history of blood clots: No      Allergies: Ceclor [cefaclor]      Current Outpatient Medications:     Multiple Vitamin (MULTIVITAMIN ADULT PO), Take by mouth, Disp: , Rfl:     CREATINE PO, Take by mouth, Disp: , Rfl:     Omega-3 Fatty Acids (FISH OIL PO), Take by mouth, Disp: , Rfl:     Ascorbic Acid (VITAMIN C ER PO), Take by mouth , Disp: , Rfl:     Social History     Substance and Sexual Activity   Sexual Activity Yes    Partners: Male       Any bleeding or pain with intercourse: No    Last Yearly:      Last pap:  - normal    Last HPV:  - HPV    Do you do self breast exams: Encouraged    Past Medical History:

## 2023-08-16 ENCOUNTER — TELEPHONE (OUTPATIENT)
Dept: OBGYN CLINIC | Age: 33
End: 2023-08-16

## 2023-08-16 NOTE — TELEPHONE ENCOUNTER
Patient called she is wanting to do labs as she had a positive at home pregnancy test. She's a little nervous, and does not want to wait till her Viability in September.

## 2023-08-17 NOTE — TELEPHONE ENCOUNTER
Spoke with pt this morning and she had another +HPT this morning. Pt declines lab work and will just come in for appt in September.

## 2023-09-11 SDOH — ECONOMIC STABILITY: FOOD INSECURITY: WITHIN THE PAST 12 MONTHS, THE FOOD YOU BOUGHT JUST DIDN'T LAST AND YOU DIDN'T HAVE MONEY TO GET MORE.: NEVER TRUE

## 2023-09-11 SDOH — ECONOMIC STABILITY: HOUSING INSECURITY
IN THE LAST 12 MONTHS, WAS THERE A TIME WHEN YOU DID NOT HAVE A STEADY PLACE TO SLEEP OR SLEPT IN A SHELTER (INCLUDING NOW)?: NO

## 2023-09-11 SDOH — ECONOMIC STABILITY: INCOME INSECURITY: HOW HARD IS IT FOR YOU TO PAY FOR THE VERY BASICS LIKE FOOD, HOUSING, MEDICAL CARE, AND HEATING?: NOT VERY HARD

## 2023-09-11 SDOH — ECONOMIC STABILITY: TRANSPORTATION INSECURITY
IN THE PAST 12 MONTHS, HAS LACK OF TRANSPORTATION KEPT YOU FROM MEETINGS, WORK, OR FROM GETTING THINGS NEEDED FOR DAILY LIVING?: NO

## 2023-09-11 SDOH — ECONOMIC STABILITY: FOOD INSECURITY: WITHIN THE PAST 12 MONTHS, YOU WORRIED THAT YOUR FOOD WOULD RUN OUT BEFORE YOU GOT MONEY TO BUY MORE.: NEVER TRUE

## 2023-09-11 ASSESSMENT — PATIENT HEALTH QUESTIONNAIRE - PHQ9
SUM OF ALL RESPONSES TO PHQ9 QUESTIONS 1 & 2: 0
1. LITTLE INTEREST OR PLEASURE IN DOING THINGS: NOT AT ALL
2. FEELING DOWN, DEPRESSED OR HOPELESS: NOT AT ALL
SUM OF ALL RESPONSES TO PHQ QUESTIONS 1-9: 0
SUM OF ALL RESPONSES TO PHQ QUESTIONS 1-9: 0
SUM OF ALL RESPONSES TO PHQ9 QUESTIONS 1 & 2: 0
2. FEELING DOWN, DEPRESSED OR HOPELESS: 0
1. LITTLE INTEREST OR PLEASURE IN DOING THINGS: 0
SUM OF ALL RESPONSES TO PHQ QUESTIONS 1-9: 0
SUM OF ALL RESPONSES TO PHQ QUESTIONS 1-9: 0

## 2023-09-12 ENCOUNTER — OFFICE VISIT (OUTPATIENT)
Dept: OBGYN CLINIC | Age: 33
End: 2023-09-12
Payer: COMMERCIAL

## 2023-09-12 VITALS
BODY MASS INDEX: 22.36 KG/M2 | WEIGHT: 134.2 LBS | DIASTOLIC BLOOD PRESSURE: 72 MMHG | HEIGHT: 65 IN | SYSTOLIC BLOOD PRESSURE: 120 MMHG

## 2023-09-12 DIAGNOSIS — N91.1 AMENORRHEA, SECONDARY: Primary | ICD-10-CM

## 2023-09-12 PROCEDURE — 99213 OFFICE O/P EST LOW 20 MIN: CPT | Performed by: ADVANCED PRACTICE MIDWIFE

## 2023-09-12 PROCEDURE — G8427 DOCREV CUR MEDS BY ELIG CLIN: HCPCS | Performed by: ADVANCED PRACTICE MIDWIFE

## 2023-09-12 PROCEDURE — 1036F TOBACCO NON-USER: CPT | Performed by: ADVANCED PRACTICE MIDWIFE

## 2023-09-12 PROCEDURE — G8420 CALC BMI NORM PARAMETERS: HCPCS | Performed by: ADVANCED PRACTICE MIDWIFE

## 2023-09-12 ASSESSMENT — ENCOUNTER SYMPTOMS
NAUSEA: 1
RESPIRATORY NEGATIVE: 1
VOMITING: 0

## 2023-09-12 NOTE — PROGRESS NOTES
PROBLEM VISIT     Date of service: 2023    Sebastien Moore  Is a 33 y.o.  female    PT's PCP is: No primary care provider on file. : 1990                                          HPI Here with spouse for USN f/u. Unplanned pregnancy but after Usn both are excited. Nausea present, no emesis. Denies bleeding. Taking PNV    Review of Systems   Constitutional:  Positive for fatigue. HENT: Negative. Respiratory: Negative. Gastrointestinal:  Positive for nausea. Negative for vomiting. Genitourinary:  Positive for menstrual problem (amenorrhea). Negative for pelvic pain and vaginal bleeding. Neurological: Negative. Psychiatric/Behavioral: Negative. Patient's last menstrual period was 2023. OB History    Para Term  AB Living   3 2 2     2   SAB IAB Ectopic Molar Multiple Live Births           0 2      # Outcome Date GA Lbr Reginald/2nd Weight Sex Delivery Anes PTL Lv   3 Current            2 Term 03/10/22 38w4d 05:44 / 00:18 7 lb 9.5 oz (3.445 kg) M Vag-Spont EPI N FILIPPO   1 Term 07/24/15 40w0d  7 lb 7 oz (3.374 kg) M Vag-Spont EPI N FILIPPO        Social History     Tobacco Use   Smoking Status Never   Smokeless Tobacco Never        Social History     Substance and Sexual Activity   Alcohol Use Yes    Comment: rare       Allergies: Ceclor [cefaclor]      Current Outpatient Medications:     Prenatal MV-Min-Fe Fum-FA-DHA (PRENATAL 1 PO), Take by mouth, Disp: , Rfl:     Omega-3 Fatty Acids (FISH OIL PO), Take by mouth, Disp: , Rfl:     Ascorbic Acid (VITAMIN C ER PO), Take by mouth , Disp: , Rfl:     Social History     Substance and Sexual Activity   Sexual Activity Yes    Partners: Male       Chief Complaint   Patient presents with    Amenorrhea     Pt here for viability USN f/u. Pt c/o nausea. Physical Exam  Constitutional:       Appearance: Normal appearance. She is normal weight. HENT:      Head: Normocephalic.    Eyes:      Pupils: Pupils are equal,

## 2023-09-25 ENCOUNTER — INITIAL PRENATAL (OUTPATIENT)
Dept: OBGYN CLINIC | Age: 33
End: 2023-09-25

## 2023-09-25 ENCOUNTER — HOSPITAL ENCOUNTER (OUTPATIENT)
Age: 33
Setting detail: SPECIMEN
Discharge: HOME | End: 2023-09-25

## 2023-09-25 VITALS
WEIGHT: 135.6 LBS | DIASTOLIC BLOOD PRESSURE: 68 MMHG | SYSTOLIC BLOOD PRESSURE: 116 MMHG | HEIGHT: 65 IN | BODY MASS INDEX: 22.59 KG/M2

## 2023-09-25 DIAGNOSIS — Z34.81 ENCOUNTER FOR SUPERVISION OF OTHER NORMAL PREGNANCY IN FIRST TRIMESTER: Primary | ICD-10-CM

## 2023-09-25 DIAGNOSIS — Z34.81 ENCOUNTER FOR SUPERVISION OF OTHER NORMAL PREGNANCY IN FIRST TRIMESTER: ICD-10-CM

## 2023-09-25 DIAGNOSIS — Z87.59 HISTORY OF GESTATIONAL HYPERTENSION: ICD-10-CM

## 2023-09-25 LAB
ABO + RH BLD: NORMAL
ALBUMIN SERPL-MCNC: 4.3 G/DL (ref 3.5–5.2)
ALBUMIN/GLOB SERPL: 1.4 {RATIO} (ref 1–2.5)
ALP SERPL-CCNC: 41 U/L (ref 35–104)
ALT SERPL-CCNC: 16 U/L (ref 5–33)
AMPHET UR QL SCN: NEGATIVE
ANION GAP SERPL CALCULATED.3IONS-SCNC: 13 MMOL/L (ref 9–17)
AST SERPL-CCNC: 18 U/L
BARBITURATES UR QL SCN: NEGATIVE
BASOPHILS # BLD: 0.03 K/UL (ref 0–0.2)
BASOPHILS NFR BLD: 1 % (ref 0–2)
BENZODIAZ UR QL: NEGATIVE
BILIRUB SERPL-MCNC: 1 MG/DL (ref 0.3–1.2)
BILIRUB UR QL STRIP: NEGATIVE
BLOOD BANK COMMENT: NORMAL
BLOOD GROUP ANTIBODIES SERPL: NEGATIVE
BUN SERPL-MCNC: 17 MG/DL (ref 6–20)
CALCIUM SERPL-MCNC: 9.2 MG/DL (ref 8.6–10.4)
CANNABINOIDS UR QL SCN: NEGATIVE
CHLORIDE SERPL-SCNC: 100 MMOL/L (ref 98–107)
CLARITY UR: CLEAR
CO2 SERPL-SCNC: 22 MMOL/L (ref 20–31)
COCAINE UR QL SCN: NEGATIVE
COLOR UR: YELLOW
COMMENT: NORMAL
CREAT SERPL-MCNC: 0.5 MG/DL (ref 0.5–0.9)
CREAT UR-MCNC: 16.4 MG/DL (ref 28–217)
EOSINOPHIL # BLD: 0.12 K/UL (ref 0–0.44)
EOSINOPHILS RELATIVE PERCENT: 2 % (ref 1–4)
ERYTHROCYTE [DISTWIDTH] IN BLOOD BY AUTOMATED COUNT: 11.6 % (ref 11.8–14.4)
FENTANYL UR QL: NEGATIVE
GFR SERPL CREATININE-BSD FRML MDRD: >60 ML/MIN/1.73M2
GLUCOSE SERPL-MCNC: 87 MG/DL (ref 70–99)
GLUCOSE UR STRIP-MCNC: NEGATIVE MG/DL
HBV SURFACE AG SERPL QL IA: NONREACTIVE
HCT VFR BLD AUTO: 39.3 % (ref 36.3–47.1)
HCV AB SERPL QL IA: NONREACTIVE
HGB BLD-MCNC: 13 G/DL (ref 11.9–15.1)
HGB UR QL STRIP.AUTO: NEGATIVE
HIV 1+2 AB+HIV1 P24 AG SERPL QL IA: NONREACTIVE
IMM GRANULOCYTES # BLD AUTO: <0.03 K/UL (ref 0–0.3)
IMM GRANULOCYTES NFR BLD: 0 %
KETONES UR STRIP-MCNC: NEGATIVE MG/DL
LEUKOCYTE ESTERASE UR QL STRIP: NEGATIVE
LYMPHOCYTES NFR BLD: 1.62 K/UL (ref 1.1–3.7)
LYMPHOCYTES RELATIVE PERCENT: 25 % (ref 24–43)
MCH RBC QN AUTO: 29.7 PG (ref 25.2–33.5)
MCHC RBC AUTO-ENTMCNC: 33.1 G/DL (ref 28.4–34.8)
MCV RBC AUTO: 89.9 FL (ref 82.6–102.9)
METHADONE UR QL: NEGATIVE
MONOCYTES NFR BLD: 0.38 K/UL (ref 0.1–1.2)
MONOCYTES NFR BLD: 6 % (ref 3–12)
NEUTROPHILS NFR BLD: 66 % (ref 36–65)
NEUTS SEG NFR BLD: 4.37 K/UL (ref 1.5–8.1)
NITRITE UR QL STRIP: NEGATIVE
NRBC BLD-RTO: 0 PER 100 WBC
OPIATES UR QL SCN: NEGATIVE
OXYCODONE UR QL SCN: NEGATIVE
PCP UR QL SCN: NEGATIVE
PH UR STRIP: 6.5 [PH] (ref 5–8)
PLATELET # BLD AUTO: 221 K/UL (ref 138–453)
PMV BLD AUTO: 9.4 FL (ref 8.1–13.5)
POTASSIUM SERPL-SCNC: 4.2 MMOL/L (ref 3.7–5.3)
PROT SERPL-MCNC: 7.4 G/DL (ref 6.4–8.3)
PROT UR STRIP-MCNC: NEGATIVE MG/DL
RBC # BLD AUTO: 4.37 M/UL (ref 3.95–5.11)
RUBV IGG SERPL QL IA: 380.1 IU/ML
SODIUM SERPL-SCNC: 135 MMOL/L (ref 135–144)
SP GR UR STRIP: 1 (ref 1–1.03)
T PALLIDUM AB SER QL IA: NONREACTIVE
TEST INFORMATION: NORMAL
TOTAL PROTEIN, URINE: <4 MG/DL
URATE SERPL-MCNC: 2.8 MG/DL (ref 2.4–5.7)
URINE TOTAL PROTEIN CREATININE RATIO: ABNORMAL (ref 0–0.2)
UROBILINOGEN UR STRIP-ACNC: NORMAL EU/DL (ref 0–1)
WBC OTHER # BLD: 6.5 K/UL (ref 3.5–11.3)

## 2023-09-25 PROCEDURE — 0500F INITIAL PRENATAL CARE VISIT: CPT

## 2023-09-25 NOTE — PROGRESS NOTES
Here for PNI, no complaints today. Denies hx of MRSA. Desires CFDNA and it is drawn today with . Hx GHTN so baseline BP labs drawn. Has NOB appt 10/11. Questions answered and forms signed.

## 2023-09-26 LAB
CHLAMYDIA DNA UR QL NAA+PROBE: NEGATIVE
MICROORGANISM SPEC CULT: NORMAL
N GONORRHOEA DNA UR QL NAA+PROBE: NEGATIVE
SPECIMEN DESCRIPTION: NORMAL
SPECIMEN DESCRIPTION: NORMAL

## 2023-10-10 ENCOUNTER — INITIAL PRENATAL (OUTPATIENT)
Dept: OBGYN CLINIC | Age: 33
End: 2023-10-10

## 2023-10-10 VITALS — WEIGHT: 136.2 LBS | SYSTOLIC BLOOD PRESSURE: 80 MMHG | DIASTOLIC BLOOD PRESSURE: 60 MMHG | BODY MASS INDEX: 22.66 KG/M2

## 2023-10-10 DIAGNOSIS — Z34.81 PRENATAL CARE, SUBSEQUENT PREGNANCY, FIRST TRIMESTER: Primary | ICD-10-CM

## 2023-10-10 PROCEDURE — 0501F PRENATAL FLOW SHEET: CPT | Performed by: ADVANCED PRACTICE MIDWIFE

## 2023-11-07 ENCOUNTER — ROUTINE PRENATAL (OUTPATIENT)
Dept: OBGYN CLINIC | Age: 33
End: 2023-11-07

## 2023-11-07 VITALS — DIASTOLIC BLOOD PRESSURE: 70 MMHG | SYSTOLIC BLOOD PRESSURE: 108 MMHG | WEIGHT: 139.6 LBS | BODY MASS INDEX: 23.23 KG/M2

## 2023-11-07 DIAGNOSIS — Z34.82 PRENATAL CARE, SUBSEQUENT PREGNANCY, SECOND TRIMESTER: Primary | ICD-10-CM

## 2023-11-07 PROCEDURE — 0502F SUBSEQUENT PRENATAL CARE: CPT | Performed by: ADVANCED PRACTICE MIDWIFE

## 2023-11-07 RX ORDER — ASPIRIN 81 MG/1
81 TABLET, CHEWABLE ORAL DAILY
COMMUNITY

## 2023-11-14 ENCOUNTER — TELEPHONE (OUTPATIENT)
Dept: OBGYN CLINIC | Age: 33
End: 2023-11-14

## 2023-11-14 NOTE — TELEPHONE ENCOUNTER
Make sure eating/hydrating well and can take Tylenol  If persists can bring in for BP check and possible labs.

## 2023-11-14 NOTE — TELEPHONE ENCOUNTER
Pt left VM stating she has been feeling off and different. Has a bad headache for the past 3 days on and off.  Pt states just does not feel normal.

## 2023-12-06 ENCOUNTER — ROUTINE PRENATAL (OUTPATIENT)
Dept: OBGYN CLINIC | Age: 33
End: 2023-12-06

## 2023-12-06 VITALS — BODY MASS INDEX: 23.7 KG/M2 | DIASTOLIC BLOOD PRESSURE: 70 MMHG | WEIGHT: 142.4 LBS | SYSTOLIC BLOOD PRESSURE: 102 MMHG

## 2023-12-06 DIAGNOSIS — Z34.92 NORMAL PREGNANCY IN SECOND TRIMESTER: Primary | ICD-10-CM

## 2023-12-06 DIAGNOSIS — Z3A.20 20 WEEKS GESTATION OF PREGNANCY: ICD-10-CM

## 2023-12-06 DIAGNOSIS — G93.0 CHOROID PLEXUS CYST: ICD-10-CM

## 2023-12-06 PROCEDURE — 0502F SUBSEQUENT PRENATAL CARE: CPT

## 2023-12-06 NOTE — PROGRESS NOTES
5.2cm  Breech  Posterior placenta  Right choroid plexus cyst chiquita: 0.6 x 0.8 x 0.5cm  Ductal arch not see due to fetal position. Reviewed incomplete anatomy and choroid plexus cyst. Will plan for repeat ultrasound in four weeks. NIPT WNL.

## 2023-12-07 NOTE — PROGRESS NOTES
Monica Oropeza is here at 38w2d for an NST due to rule out uterine contractions    FHT;        Baseline Heart Rate:  140        Accelerations:  present       Long Term Variability:  moderate       Decelerations:  absent         Contraction frequency: 3-5 minutes      reactive    Plan: Monitor closely for active labor s/s Psychiatric Evaluation

## 2023-12-11 ENCOUNTER — TELEPHONE (OUTPATIENT)
Dept: OBGYN CLINIC | Age: 33
End: 2023-12-11

## 2023-12-11 NOTE — TELEPHONE ENCOUNTER
Pt called concerned about recent dental visit. Pt stated she went to a new dentist today and they took x-rays of her mouth. Pt states she had a full length lead gown on during x-ray. Pt called concerned about any risk to baby. Pt states she told the staff there several times that she was pregnant. Advised pt per CONNIE Gee that amount of radiation released was very minimal and that pt was well protected. Pt voiced understanding. Pt then called back about 15 minutes later and stated she talked with the dentist office and they told her she had a full length lead gown. Assured pt that she was still well protected to risk to baby was very small. Pt also asked about a pick they used on her teeth. Advised pt that was safe. Pt is wondering if there is anything that can be done to make sure baby was okay. Advised pt that she could come in for heart tones. Pt states she baby has been moving daily. Pt will monitor things today and if still concerned will call back tomorrow.

## 2024-01-04 ENCOUNTER — ROUTINE PRENATAL (OUTPATIENT)
Dept: OBGYN CLINIC | Age: 34
End: 2024-01-04

## 2024-01-04 VITALS — WEIGHT: 144.2 LBS | DIASTOLIC BLOOD PRESSURE: 72 MMHG | SYSTOLIC BLOOD PRESSURE: 110 MMHG | BODY MASS INDEX: 24 KG/M2

## 2024-01-04 DIAGNOSIS — Z34.82 PRENATAL CARE, SUBSEQUENT PREGNANCY, SECOND TRIMESTER: Primary | ICD-10-CM

## 2024-01-04 PROCEDURE — 0502F SUBSEQUENT PRENATAL CARE: CPT | Performed by: ADVANCED PRACTICE MIDWIFE

## 2024-01-04 NOTE — PROGRESS NOTES
Nai Felix is a 33 y.o. female 24w3d    The patient was seen and evaluated. There was positive fetal movements. No contractions or leakage of fluid. Signs and symptoms of  labor as well as labor were reviewed. The patients anatomy ultrasound has been completed and reviewed with patient. A 28 week lab panel was ordered. This includes a (HH, 1 hr GTT). The patient is to complete this in the next two to four weeks.    The S/S of Pre-Eclampsia were reviewed with the patient in detail. She is to report any of these if they occur. She currently denies any of these.    The patient is RH negative Rhogam Ordered no    The patient was instructed on fetal kick counts and was encouraged to monitor every 8 hours. This is to begin at 28 weeks gestation. She was instructed that the baby should move at a minimum of ten times within one hour after a meal. The patient was instructed to lay down on her left side twenty minutes after eating and count movements for up to one hour with a target value of ten movements.  She was instructed to notify the office if she did not make that target after two attempts or if after any attempt there was less than four movements.      Assessment:  1. Nai Felix is a 33 y.o. female  2.   3. 24w3d    Patient Active Problem List    Diagnosis Date Noted    Pre-eclampsia in third trimester     Elevated liver function tests     Vaginal delivery     COVID-19 affecting pregnancy in third trimester 2022        Diagnosis Orders   1. Prenatal care, subsequent pregnancy, second trimester  Glucose tolerance, 1 hour    CBC with Auto Differential            Plan:  The patient will return to the office for her next visit in 4 weeks. See antepartum flow sheet.

## 2024-01-23 ENCOUNTER — ROUTINE PRENATAL (OUTPATIENT)
Dept: OBGYN CLINIC | Age: 34
End: 2024-01-23

## 2024-01-23 ENCOUNTER — HOSPITAL ENCOUNTER (OUTPATIENT)
Age: 34
Setting detail: SPECIMEN
Discharge: HOME | End: 2024-01-23

## 2024-01-23 VITALS — DIASTOLIC BLOOD PRESSURE: 60 MMHG | BODY MASS INDEX: 24.89 KG/M2 | WEIGHT: 149.6 LBS | SYSTOLIC BLOOD PRESSURE: 110 MMHG

## 2024-01-23 DIAGNOSIS — Z34.82 PRENATAL CARE, SUBSEQUENT PREGNANCY, SECOND TRIMESTER: ICD-10-CM

## 2024-01-23 DIAGNOSIS — Z34.82 PRENATAL CARE, SUBSEQUENT PREGNANCY, SECOND TRIMESTER: Primary | ICD-10-CM

## 2024-01-23 LAB
BASOPHILS # BLD: <0.03 K/UL (ref 0–0.2)
BASOPHILS NFR BLD: 0 % (ref 0–2)
EOSINOPHIL # BLD: 0.11 K/UL (ref 0–0.44)
EOSINOPHILS RELATIVE PERCENT: 2 % (ref 1–4)
ERYTHROCYTE [DISTWIDTH] IN BLOOD BY AUTOMATED COUNT: 12.1 % (ref 11.8–14.4)
GLUCOSE 1H P 50 G GLC PO SERPL-MCNC: 96 MG/DL (ref 70–135)
GLUCOSE ADMINISTRATION: NORMAL
HCT VFR BLD AUTO: 34.3 % (ref 36.3–47.1)
HGB BLD-MCNC: 11.2 G/DL (ref 11.9–15.1)
IMM GRANULOCYTES # BLD AUTO: 0.03 K/UL (ref 0–0.3)
IMM GRANULOCYTES NFR BLD: 0 %
LYMPHOCYTES NFR BLD: 1.61 K/UL (ref 1.1–3.7)
LYMPHOCYTES RELATIVE PERCENT: 22 % (ref 24–43)
MCH RBC QN AUTO: 30.9 PG (ref 25.2–33.5)
MCHC RBC AUTO-ENTMCNC: 32.7 G/DL (ref 28.4–34.8)
MCV RBC AUTO: 94.5 FL (ref 82.6–102.9)
MONOCYTES NFR BLD: 0.43 K/UL (ref 0.1–1.2)
MONOCYTES NFR BLD: 6 % (ref 3–12)
NEUTROPHILS NFR BLD: 70 % (ref 36–65)
NEUTS SEG NFR BLD: 4.99 K/UL (ref 1.5–8.1)
NRBC BLD-RTO: 0 PER 100 WBC
PLATELET # BLD AUTO: 215 K/UL (ref 138–453)
PMV BLD AUTO: 9.6 FL (ref 8.1–13.5)
RBC # BLD AUTO: 3.63 M/UL (ref 3.95–5.11)
WBC OTHER # BLD: 7.2 K/UL (ref 3.5–11.3)

## 2024-01-23 PROCEDURE — 0502F SUBSEQUENT PRENATAL CARE: CPT | Performed by: ADVANCED PRACTICE MIDWIFE

## 2024-01-23 NOTE — PROGRESS NOTES
patient will return to the office for her next visit in 2-3 weeks. See antepartum flow sheet.   Visits will continue every 2 weeks in the third trimester.  At 36 weeks will have GBS swab performed and begin weekly visits.  Rhogam order to be given next visit     Testing Indicated: No  Scheduled with Front Office Staff - Pt notified: No

## 2024-02-15 ENCOUNTER — ROUTINE PRENATAL (OUTPATIENT)
Dept: OBGYN CLINIC | Age: 34
End: 2024-02-15
Payer: COMMERCIAL

## 2024-02-15 VITALS — WEIGHT: 149 LBS | DIASTOLIC BLOOD PRESSURE: 60 MMHG | SYSTOLIC BLOOD PRESSURE: 102 MMHG | BODY MASS INDEX: 24.79 KG/M2

## 2024-02-15 DIAGNOSIS — O26.893 RH NEGATIVE STATE IN ANTEPARTUM PERIOD, THIRD TRIMESTER: ICD-10-CM

## 2024-02-15 DIAGNOSIS — Z67.91 RH NEGATIVE STATE IN ANTEPARTUM PERIOD, THIRD TRIMESTER: ICD-10-CM

## 2024-02-15 DIAGNOSIS — Z34.83 PRENATAL CARE, SUBSEQUENT PREGNANCY, THIRD TRIMESTER: Primary | ICD-10-CM

## 2024-02-15 PROCEDURE — 36415 COLL VENOUS BLD VENIPUNCTURE: CPT | Performed by: ADVANCED PRACTICE MIDWIFE

## 2024-02-15 PROCEDURE — 0502F SUBSEQUENT PRENATAL CARE: CPT | Performed by: ADVANCED PRACTICE MIDWIFE

## 2024-02-15 NOTE — PROGRESS NOTES
Nai Felix is a 33 y.o. female 30w3d    The patient was seen and evaluated. There was positive fetal movements. No contractions or leakage of fluid. Signs and symptoms of  labor as well as labor were reviewed. The S/S of Pre-Eclampsia were reviewed with the patient in detail. She is to report any of these if they occur. She currently denies any of these.    The patient had her 28 week labs completed.  Hospital Outpatient Visit on 2024   Component Date Value Ref Range Status    WBC 2024 7.2  3.5 - 11.3 k/uL Final    RBC 2024 3.63 (L)  3.95 - 5.11 m/uL Final    Hemoglobin 2024 11.2 (L)  11.9 - 15.1 g/dL Final    Hematocrit 2024 34.3 (L)  36.3 - 47.1 % Final    MCV 2024 94.5  82.6 - 102.9 fL Final    MCH 2024 30.9  25.2 - 33.5 pg Final    MCHC 2024 32.7  28.4 - 34.8 g/dL Final    RDW 2024 12.1  11.8 - 14.4 % Final    Platelets 2024 215  138 - 453 k/uL Final    MPV 2024 9.6  8.1 - 13.5 fL Final    NRBC Automated 2024 0.0  0.0 per 100 WBC Final    Neutrophils % 2024 70 (H)  36 - 65 % Final    Lymphocytes % 2024 22 (L)  24 - 43 % Final    Monocytes % 2024 6  3 - 12 % Final    Eosinophils % 2024 2  1 - 4 % Final    Basophils % 2024 0  0 - 2 % Final    Immature Granulocytes 2024 0  0 % Final    Neutrophils Absolute 2024 4.99  1.50 - 8.10 k/uL Final    Lymphocytes Absolute 2024 1.61  1.10 - 3.70 k/uL Final    Monocytes Absolute 2024 0.43  0.10 - 1.20 k/uL Final    Eosinophils Absolute 2024 0.11  0.00 - 0.44 k/uL Final    Basophils Absolute 2024 <0.03  0.00 - 0.20 k/uL Final    Absolute Immature Granulocyte 2024 0.03  0.00 - 0.30 k/uL Final    GLU ADMN 2024 Glucola   Final    Glucose tolerance screen 50g 2024 96  70 - 135 mg/dL Final         The patient was instructed on fetal kick counts and is encouraged to monitor every 8 hours. She was instructed that

## 2024-02-21 DIAGNOSIS — O26.893 RH NEGATIVE STATE IN ANTEPARTUM PERIOD, THIRD TRIMESTER: ICD-10-CM

## 2024-02-21 DIAGNOSIS — Z67.91 RH NEGATIVE STATE IN ANTEPARTUM PERIOD, THIRD TRIMESTER: ICD-10-CM

## 2024-02-21 PROCEDURE — 36415 COLL VENOUS BLD VENIPUNCTURE: CPT | Performed by: ADVANCED PRACTICE MIDWIFE

## 2024-02-29 ENCOUNTER — ROUTINE PRENATAL (OUTPATIENT)
Dept: OBGYN CLINIC | Age: 34
End: 2024-02-29

## 2024-02-29 VITALS — SYSTOLIC BLOOD PRESSURE: 110 MMHG | BODY MASS INDEX: 25.29 KG/M2 | DIASTOLIC BLOOD PRESSURE: 64 MMHG | WEIGHT: 152 LBS

## 2024-02-29 DIAGNOSIS — Z34.83 PRENATAL CARE, SUBSEQUENT PREGNANCY, THIRD TRIMESTER: Primary | ICD-10-CM

## 2024-02-29 PROCEDURE — 0502F SUBSEQUENT PRENATAL CARE: CPT | Performed by: ADVANCED PRACTICE MIDWIFE

## 2024-02-29 NOTE — PROGRESS NOTES
Nai Felix is a 33 y.o. female 32w3d    The patient was seen and evaluated. There was positive fetal movements. No contractions or leakage of fluid. Signs and symptoms of  labor as well as labor were reviewed. The S/S of Pre-Eclampsia were reviewed with the patient in detail. She is to report any of these if they occur. She currently denies any of these.    The patient had her 28 week labs completed.  No visits with results within 5 Week(s) from this visit.   Latest known visit with results is:   Hospital Outpatient Visit on 2024   Component Date Value Ref Range Status    WBC 2024 7.2  3.5 - 11.3 k/uL Final    RBC 2024 3.63 (L)  3.95 - 5.11 m/uL Final    Hemoglobin 2024 11.2 (L)  11.9 - 15.1 g/dL Final    Hematocrit 2024 34.3 (L)  36.3 - 47.1 % Final    MCV 2024 94.5  82.6 - 102.9 fL Final    MCH 2024 30.9  25.2 - 33.5 pg Final    MCHC 2024 32.7  28.4 - 34.8 g/dL Final    RDW 2024 12.1  11.8 - 14.4 % Final    Platelets 2024 215  138 - 453 k/uL Final    MPV 2024 9.6  8.1 - 13.5 fL Final    NRBC Automated 2024 0.0  0.0 per 100 WBC Final    Neutrophils % 2024 70 (H)  36 - 65 % Final    Lymphocytes % 2024 22 (L)  24 - 43 % Final    Monocytes % 2024 6  3 - 12 % Final    Eosinophils % 2024 2  1 - 4 % Final    Basophils % 2024 0  0 - 2 % Final    Immature Granulocytes 2024 0  0 % Final    Neutrophils Absolute 2024 4.99  1.50 - 8.10 k/uL Final    Lymphocytes Absolute 2024 1.61  1.10 - 3.70 k/uL Final    Monocytes Absolute 2024 0.43  0.10 - 1.20 k/uL Final    Eosinophils Absolute 2024 0.11  0.00 - 0.44 k/uL Final    Basophils Absolute 2024 <0.03  0.00 - 0.20 k/uL Final    Absolute Immature Granulocyte 2024 0.03  0.00 - 0.30 k/uL Final    GLU ADMN 2024 Glucola   Final    Glucose tolerance screen 50g 2024 96  70 - 135 mg/dL Final       The patient was

## 2024-03-19 ENCOUNTER — ROUTINE PRENATAL (OUTPATIENT)
Dept: OBGYN CLINIC | Age: 34
End: 2024-03-19

## 2024-03-19 VITALS — BODY MASS INDEX: 25.29 KG/M2 | SYSTOLIC BLOOD PRESSURE: 90 MMHG | WEIGHT: 152 LBS | DIASTOLIC BLOOD PRESSURE: 60 MMHG

## 2024-03-19 DIAGNOSIS — Z34.83 PRENATAL CARE, SUBSEQUENT PREGNANCY, THIRD TRIMESTER: Primary | ICD-10-CM

## 2024-03-19 PROCEDURE — 0502F SUBSEQUENT PRENATAL CARE: CPT | Performed by: ADVANCED PRACTICE MIDWIFE

## 2024-03-19 NOTE — PROGRESS NOTES
Nai Felix is a 33 y.o. female 35w1d    The patient was seen and evaluated. There was positive fetal movements. No contractions or leakage of fluid. Signs and symptoms of  labor as well as labor were reviewed. The S/S of Pre-Eclampsia were reviewed with the patient in detail. She is to report any of these if they occur. She currently denies any of these.    The patient had her 28 week labs completed.  No visits with results within 5 Week(s) from this visit.   Latest known visit with results is:   Hospital Outpatient Visit on 2024   Component Date Value Ref Range Status    WBC 2024 7.2  3.5 - 11.3 k/uL Final    RBC 2024 3.63 (L)  3.95 - 5.11 m/uL Final    Hemoglobin 2024 11.2 (L)  11.9 - 15.1 g/dL Final    Hematocrit 2024 34.3 (L)  36.3 - 47.1 % Final    MCV 2024 94.5  82.6 - 102.9 fL Final    MCH 2024 30.9  25.2 - 33.5 pg Final    MCHC 2024 32.7  28.4 - 34.8 g/dL Final    RDW 2024 12.1  11.8 - 14.4 % Final    Platelets 2024 215  138 - 453 k/uL Final    MPV 2024 9.6  8.1 - 13.5 fL Final    NRBC Automated 2024 0.0  0.0 per 100 WBC Final    Neutrophils % 2024 70 (H)  36 - 65 % Final    Lymphocytes % 2024 22 (L)  24 - 43 % Final    Monocytes % 2024 6  3 - 12 % Final    Eosinophils % 2024 2  1 - 4 % Final    Basophils % 2024 0  0 - 2 % Final    Immature Granulocytes 2024 0  0 % Final    Neutrophils Absolute 2024 4.99  1.50 - 8.10 k/uL Final    Lymphocytes Absolute 2024 1.61  1.10 - 3.70 k/uL Final    Monocytes Absolute 2024 0.43  0.10 - 1.20 k/uL Final    Eosinophils Absolute 2024 0.11  0.00 - 0.44 k/uL Final    Basophils Absolute 2024 <0.03  0.00 - 0.20 k/uL Final    Absolute Immature Granulocyte 2024 0.03  0.00 - 0.30 k/uL Final    GLU ADMN 2024 Glucola   Final    Glucose tolerance screen 50g 2024 96  70 - 135 mg/dL Final         The patient was

## 2024-03-25 ENCOUNTER — HOSPITAL ENCOUNTER (OUTPATIENT)
Age: 34
Setting detail: SPECIMEN
Discharge: HOME | End: 2024-03-25

## 2024-03-25 ENCOUNTER — ROUTINE PRENATAL (OUTPATIENT)
Dept: OBGYN CLINIC | Age: 34
End: 2024-03-25

## 2024-03-25 VITALS — DIASTOLIC BLOOD PRESSURE: 80 MMHG | SYSTOLIC BLOOD PRESSURE: 94 MMHG | BODY MASS INDEX: 25.46 KG/M2 | WEIGHT: 153 LBS

## 2024-03-25 DIAGNOSIS — T14.8XXA BRUISING: ICD-10-CM

## 2024-03-25 DIAGNOSIS — Z34.83 PRENATAL CARE, SUBSEQUENT PREGNANCY, THIRD TRIMESTER: Primary | ICD-10-CM

## 2024-03-25 DIAGNOSIS — Z34.83 PRENATAL CARE, SUBSEQUENT PREGNANCY, THIRD TRIMESTER: ICD-10-CM

## 2024-03-25 LAB
BASOPHILS # BLD: <0.03 K/UL (ref 0–0.2)
BASOPHILS NFR BLD: 0 % (ref 0–2)
EOSINOPHIL # BLD: 0.08 K/UL (ref 0–0.44)
EOSINOPHILS RELATIVE PERCENT: 1 % (ref 1–4)
ERYTHROCYTE [DISTWIDTH] IN BLOOD BY AUTOMATED COUNT: 12.3 % (ref 11.8–14.4)
HCT VFR BLD AUTO: 35.2 % (ref 36.3–47.1)
HGB BLD-MCNC: 12.1 G/DL (ref 11.9–15.1)
IMM GRANULOCYTES # BLD AUTO: <0.03 K/UL (ref 0–0.3)
IMM GRANULOCYTES NFR BLD: 0 %
LYMPHOCYTES NFR BLD: 1.49 K/UL (ref 1.1–3.7)
LYMPHOCYTES RELATIVE PERCENT: 21 % (ref 24–43)
MCH RBC QN AUTO: 31.3 PG (ref 25.2–33.5)
MCHC RBC AUTO-ENTMCNC: 34.4 G/DL (ref 28.4–34.8)
MCV RBC AUTO: 91 FL (ref 82.6–102.9)
MONOCYTES NFR BLD: 0.48 K/UL (ref 0.1–1.2)
MONOCYTES NFR BLD: 7 % (ref 3–12)
NEUTROPHILS NFR BLD: 71 % (ref 36–65)
NEUTS SEG NFR BLD: 5.12 K/UL (ref 1.5–8.1)
NRBC BLD-RTO: 0 PER 100 WBC
PLATELET # BLD AUTO: 179 K/UL (ref 138–453)
PMV BLD AUTO: 10.7 FL (ref 8.1–13.5)
RBC # BLD AUTO: 3.87 M/UL (ref 3.95–5.11)
WBC OTHER # BLD: 7.2 K/UL (ref 3.5–11.3)

## 2024-03-25 PROCEDURE — 0502F SUBSEQUENT PRENATAL CARE: CPT | Performed by: ADVANCED PRACTICE MIDWIFE

## 2024-03-25 NOTE — PROGRESS NOTES
Nai Felix is a 33 y.o. female 36w0d      The patient was seen and evaluated. There was positive fetal movements. No contractions or leakage of fluid. Signs and symptoms of labor were reviewed.  The S/S of Pre-Eclampsia were reviewed with the patient in detail. She is to report any of these if they occur. She currently denies any of these.    Since last visit has noted that bruises easily on legs, without any trauma/cause.     The patient was instructed on fetal kick counts and is encouraged to complete every 8 hours. She was instructed that the baby should move at a minimum of ten times within one hour after a meal. The patient was instructed to lay down on her left side twenty minutes after eating and count movements for up to one hour with a target value of ten movements.  She was instructed to notify the office if she did not make that target after two attempts or if after any attempt there was less than four movements.    The patient reports that the fetal movement targets have been made Yes.      Allergies:  Allergies as of 03/25/2024 - Fully Reviewed 03/25/2024   Allergen Reaction Noted    Ceclor [cefaclor]  11/09/2020         Group Beta Strep collection was completed. Yes  GBS Results:   No visits with results within 3 Week(s) from this visit.   Latest known visit with results is:   Hospital Outpatient Visit on 01/23/2024   Component Date Value Ref Range Status    WBC 01/23/2024 7.2  3.5 - 11.3 k/uL Final    RBC 01/23/2024 3.63 (L)  3.95 - 5.11 m/uL Final    Hemoglobin 01/23/2024 11.2 (L)  11.9 - 15.1 g/dL Final    Hematocrit 01/23/2024 34.3 (L)  36.3 - 47.1 % Final    MCV 01/23/2024 94.5  82.6 - 102.9 fL Final    MCH 01/23/2024 30.9  25.2 - 33.5 pg Final    MCHC 01/23/2024 32.7  28.4 - 34.8 g/dL Final    RDW 01/23/2024 12.1  11.8 - 14.4 % Final    Platelets 01/23/2024 215  138 - 453 k/uL Final    MPV 01/23/2024 9.6  8.1 - 13.5 fL Final    NRBC Automated 01/23/2024 0.0  0.0 per 100 WBC Final

## 2024-03-26 LAB
C TRACH DNA SPEC QL PROBE+SIG AMP: NEGATIVE
N GONORRHOEA DNA SPEC QL PROBE+SIG AMP: NEGATIVE
SPECIMEN DESCRIPTION: NORMAL

## 2024-03-28 LAB
MICROORGANISM SPEC CULT: NORMAL
SPECIMEN DESCRIPTION: NORMAL

## 2024-04-01 ENCOUNTER — TELEPHONE (OUTPATIENT)
Dept: OBGYN | Age: 34
End: 2024-04-01

## 2024-04-01 ENCOUNTER — ROUTINE PRENATAL (OUTPATIENT)
Dept: OBGYN CLINIC | Age: 34
End: 2024-04-01

## 2024-04-01 VITALS — DIASTOLIC BLOOD PRESSURE: 80 MMHG | SYSTOLIC BLOOD PRESSURE: 128 MMHG | WEIGHT: 151 LBS | BODY MASS INDEX: 25.13 KG/M2

## 2024-04-01 DIAGNOSIS — Z34.83 PRENATAL CARE, SUBSEQUENT PREGNANCY, THIRD TRIMESTER: Primary | ICD-10-CM

## 2024-04-01 PROCEDURE — 0502F SUBSEQUENT PRENATAL CARE: CPT | Performed by: ADVANCED PRACTICE MIDWIFE

## 2024-04-01 NOTE — TELEPHONE ENCOUNTER
Please call patient - IOL scheduled for 4/17/2024 at 0530am  We will sign consents next visit for this.

## 2024-04-01 NOTE — PROGRESS NOTES
Basophils % 03/25/2024 0  0 - 2 % Final    Immature Granulocytes 03/25/2024 0  0 % Final    Neutrophils Absolute 03/25/2024 5.12  1.50 - 8.10 k/uL Final    Lymphocytes Absolute 03/25/2024 1.49  1.10 - 3.70 k/uL Final    Monocytes Absolute 03/25/2024 0.48  0.10 - 1.20 k/uL Final    Eosinophils Absolute 03/25/2024 0.08  0.00 - 0.44 k/uL Final    Basophils Absolute 03/25/2024 <0.03  0.00 - 0.20 k/uL Final    Absolute Immature Granulocyte 03/25/2024 <0.03  0.00 - 0.30 k/uL Final    Specimen Description 03/25/2024 .CERVIX   Final    C. trachomatis DNA 03/25/2024 NEGATIVE  NEGATIVE Final    Comment: CHLAMYDIA TRACHOMATIS DNA not detected by nucleic acid amplification.        This test is intended for medical purposes only and is not valid for the evaluation of   suspected sexual abuse or for other forensic purposes.  In certain contexts, culture may be required to meet applicable laws and regulations for   diagnosis of C. trachomatis and N. gonorrhoeae infections.  Per 2014  CDC recommendations, this test does not include confirmation of positive results   by an alternative nucleic acid target.      N. gonorrhoeae DNA 03/25/2024 NEGATIVE  NEGATIVE Final    Comment: NEISSERIA GONORRHOEAE DNA not detected by nucleic acid amplification.        This test is intended for medical purposes only and is not valid for the evaluation of   suspected sexual abuse or for other forensic purposes.  In certain contexts, culture may be required to meet applicable laws and regulations for   diagnosis of C. trachomatis and N. gonorrhoeae infections.  Per 2014  CDC recommendations, this test does not include confirmation of positive results   by an alternative nucleic acid target.      Specimen Description 03/25/2024 .VAGINA   Final    Culture 03/25/2024 NEGATIVE FOR GROUP B STREPTOCOCCI   Final       If not done prior in pregnancy or if had previous positive result in this pregnancy, GC/CT were collected. Yes.    The patient was counseled on

## 2024-04-09 ENCOUNTER — ROUTINE PRENATAL (OUTPATIENT)
Dept: OBGYN CLINIC | Age: 34
End: 2024-04-09

## 2024-04-09 VITALS — BODY MASS INDEX: 25.46 KG/M2 | WEIGHT: 153 LBS | DIASTOLIC BLOOD PRESSURE: 70 MMHG | SYSTOLIC BLOOD PRESSURE: 100 MMHG

## 2024-04-09 DIAGNOSIS — Z3A.38 38 WEEKS GESTATION OF PREGNANCY: ICD-10-CM

## 2024-04-09 DIAGNOSIS — Z34.83 PRENATAL CARE, SUBSEQUENT PREGNANCY, THIRD TRIMESTER: Primary | ICD-10-CM

## 2024-04-09 PROCEDURE — 0502F SUBSEQUENT PRENATAL CARE: CPT | Performed by: ADVANCED PRACTICE MIDWIFE

## 2024-04-09 NOTE — PROGRESS NOTES
Nai Felix is a 33 y.o. female 38w1d      The patient was seen and evaluated. There was positive fetal movements. No contractions or leakage of fluid. Signs and symptoms of labor were reviewed.  The S/S of Pre-Eclampsia were reviewed with the patient in detail. She is to report any of these if they occur. She currently denies any of these.    Reports since working out today - has had more RLQ abd pain down by intermittently.  With palpation to area - soft, fetal small part possibly arm is palpable with FM.  Also fetal head well engaged, possibly shoulder felt at pubic bone edge.     The patient was instructed on fetal kick counts and is encouraged to complete every 8 hours. She was instructed that the baby should move at a minimum of ten times within one hour after a meal. The patient was instructed to lay down on her left side twenty minutes after eating and count movements for up to one hour with a target value of ten movements.  She was instructed to notify the office if she did not make that target after two attempts or if after any attempt there was less than four movements.    The patient reports that the fetal movement targets have been made Yes.      T-Dap Vaccine Completed (27-36 weeks): No    Allergies:  Allergies as of 04/09/2024 - Fully Reviewed 04/09/2024   Allergen Reaction Noted    Ceclor [cefaclor]  11/09/2020         Group Beta Strep collection was completed. Yes  GBS Results:   Hospital Outpatient Visit on 03/25/2024   Component Date Value Ref Range Status    WBC 03/25/2024 7.2  3.5 - 11.3 k/uL Final    RBC 03/25/2024 3.87 (L)  3.95 - 5.11 m/uL Final    Hemoglobin 03/25/2024 12.1  11.9 - 15.1 g/dL Final    Hematocrit 03/25/2024 35.2 (L)  36.3 - 47.1 % Final    MCV 03/25/2024 91.0  82.6 - 102.9 fL Final    MCH 03/25/2024 31.3  25.2 - 33.5 pg Final    MCHC 03/25/2024 34.4  28.4 - 34.8 g/dL Final    RDW 03/25/2024 12.3  11.8 - 14.4 % Final    Platelets 03/25/2024 179  138 - 453 k/uL Final

## 2024-04-17 ENCOUNTER — APPOINTMENT (OUTPATIENT)
Dept: LABOR AND DELIVERY | Age: 34
End: 2024-04-17
Payer: COMMERCIAL

## 2024-04-17 ENCOUNTER — HOSPITAL ENCOUNTER (INPATIENT)
Age: 34
LOS: 1 days | Discharge: HOME | End: 2024-04-18
Attending: ADVANCED PRACTICE MIDWIFE | Admitting: ADVANCED PRACTICE MIDWIFE
Payer: COMMERCIAL

## 2024-04-17 PROBLEM — Z34.90 ENCOUNTER FOR INDUCTION OF LABOR: Status: ACTIVE | Noted: 2024-04-17

## 2024-04-17 LAB
ABO + RH BLD: NORMAL
ANTIBODY IDENTIFICATION: NORMAL
ARM BAND NUMBER: NORMAL
BASOPHILS # BLD: 0.03 K/UL (ref 0–0.2)
BASOPHILS NFR BLD: 1 % (ref 0–2)
BLOOD BANK SAMPLE EXPIRATION: NORMAL
BLOOD GROUP ANTIBODIES SERPL: POSITIVE
EOSINOPHIL # BLD: 0.07 K/UL (ref 0–0.44)
EOSINOPHILS RELATIVE PERCENT: 1 % (ref 1–4)
ERYTHROCYTE [DISTWIDTH] IN BLOOD BY AUTOMATED COUNT: 11.8 % (ref 11.8–14.4)
HCT VFR BLD AUTO: 36.9 % (ref 36.3–47.1)
HGB BLD-MCNC: 12.6 G/DL (ref 11.9–15.1)
IMM GRANULOCYTES # BLD AUTO: <0.03 K/UL (ref 0–0.3)
IMM GRANULOCYTES NFR BLD: 0 %
LYMPHOCYTES NFR BLD: 1.52 K/UL (ref 1.1–3.7)
LYMPHOCYTES RELATIVE PERCENT: 24 % (ref 24–43)
MCH RBC QN AUTO: 31.3 PG (ref 25.2–33.5)
MCHC RBC AUTO-ENTMCNC: 34.1 G/DL (ref 28.4–34.8)
MCV RBC AUTO: 91.8 FL (ref 82.6–102.9)
MONOCYTES NFR BLD: 0.48 K/UL (ref 0.1–1.2)
MONOCYTES NFR BLD: 8 % (ref 3–12)
NEUTROPHILS NFR BLD: 66 % (ref 36–65)
NEUTS SEG NFR BLD: 4.31 K/UL (ref 1.5–8.1)
NRBC BLD-RTO: 0 PER 100 WBC
PLATELET # BLD AUTO: 175 K/UL (ref 138–453)
PMV BLD AUTO: 10.7 FL (ref 8.1–13.5)
RBC # BLD AUTO: 4.02 M/UL (ref 3.95–5.11)
WBC OTHER # BLD: 6.4 K/UL (ref 3.5–11.3)

## 2024-04-17 PROCEDURE — 1220000000 HC SEMI PRIVATE OB R&B

## 2024-04-17 PROCEDURE — 86850 RBC ANTIBODY SCREEN: CPT

## 2024-04-17 PROCEDURE — 0KQM0ZZ REPAIR PERINEUM MUSCLE, OPEN APPROACH: ICD-10-PCS | Performed by: ADVANCED PRACTICE MIDWIFE

## 2024-04-17 PROCEDURE — 7200000001 HC VAGINAL DELIVERY

## 2024-04-17 PROCEDURE — 10907ZC DRAINAGE OF AMNIOTIC FLUID, THERAPEUTIC FROM PRODUCTS OF CONCEPTION, VIA NATURAL OR ARTIFICIAL OPENING: ICD-10-PCS | Performed by: ADVANCED PRACTICE MIDWIFE

## 2024-04-17 PROCEDURE — 85461 HEMOGLOBIN FETAL: CPT

## 2024-04-17 PROCEDURE — 86870 RBC ANTIBODY IDENTIFICATION: CPT

## 2024-04-17 PROCEDURE — 3E033VJ INTRODUCTION OF OTHER HORMONE INTO PERIPHERAL VEIN, PERCUTANEOUS APPROACH: ICD-10-PCS | Performed by: ADVANCED PRACTICE MIDWIFE

## 2024-04-17 PROCEDURE — 6360000002 HC RX W HCPCS: Performed by: ADVANCED PRACTICE MIDWIFE

## 2024-04-17 PROCEDURE — 6370000000 HC RX 637 (ALT 250 FOR IP): Performed by: ADVANCED PRACTICE MIDWIFE

## 2024-04-17 PROCEDURE — 86900 BLOOD TYPING SEROLOGIC ABO: CPT

## 2024-04-17 PROCEDURE — 85025 COMPLETE CBC W/AUTO DIFF WBC: CPT

## 2024-04-17 PROCEDURE — 36415 COLL VENOUS BLD VENIPUNCTURE: CPT

## 2024-04-17 PROCEDURE — 59400 OBSTETRICAL CARE: CPT | Performed by: ADVANCED PRACTICE MIDWIFE

## 2024-04-17 PROCEDURE — 86901 BLOOD TYPING SEROLOGIC RH(D): CPT

## 2024-04-17 PROCEDURE — 2500000003 HC RX 250 WO HCPCS: Performed by: ADVANCED PRACTICE MIDWIFE

## 2024-04-17 PROCEDURE — 2580000003 HC RX 258: Performed by: ADVANCED PRACTICE MIDWIFE

## 2024-04-17 RX ORDER — MISOPROSTOL 100 UG/1
800 TABLET ORAL PRN
Status: DISCONTINUED | OUTPATIENT
Start: 2024-04-17 | End: 2024-04-18 | Stop reason: HOSPADM

## 2024-04-17 RX ORDER — MISOPROSTOL 100 UG/1
200 TABLET ORAL PRN
Status: DISCONTINUED | OUTPATIENT
Start: 2024-04-17 | End: 2024-04-18 | Stop reason: HOSPADM

## 2024-04-17 RX ORDER — IBUPROFEN 800 MG/1
800 TABLET ORAL EVERY 8 HOURS
Status: DISCONTINUED | OUTPATIENT
Start: 2024-04-17 | End: 2024-04-18 | Stop reason: HOSPADM

## 2024-04-17 RX ORDER — CARBOPROST TROMETHAMINE 250 UG/ML
250 INJECTION, SOLUTION INTRAMUSCULAR PRN
Status: DISCONTINUED | OUTPATIENT
Start: 2024-04-17 | End: 2024-04-18 | Stop reason: HOSPADM

## 2024-04-17 RX ORDER — METHYLERGONOVINE MALEATE 0.2 MG/ML
200 INJECTION INTRAVENOUS PRN
Status: DISCONTINUED | OUTPATIENT
Start: 2024-04-17 | End: 2024-04-17

## 2024-04-17 RX ORDER — SODIUM CHLORIDE, SODIUM LACTATE, POTASSIUM CHLORIDE, CALCIUM CHLORIDE 600; 310; 30; 20 MG/100ML; MG/100ML; MG/100ML; MG/100ML
INJECTION, SOLUTION INTRAVENOUS CONTINUOUS
Status: DISCONTINUED | OUTPATIENT
Start: 2024-04-17 | End: 2024-04-17

## 2024-04-17 RX ORDER — SODIUM CHLORIDE, SODIUM LACTATE, POTASSIUM CHLORIDE, AND CALCIUM CHLORIDE .6; .31; .03; .02 G/100ML; G/100ML; G/100ML; G/100ML
500 INJECTION, SOLUTION INTRAVENOUS PRN
Status: DISCONTINUED | OUTPATIENT
Start: 2024-04-17 | End: 2024-04-17

## 2024-04-17 RX ORDER — ACETAMINOPHEN 325 MG/1
650 TABLET ORAL EVERY 4 HOURS PRN
Status: DISCONTINUED | OUTPATIENT
Start: 2024-04-17 | End: 2024-04-17

## 2024-04-17 RX ORDER — LIDOCAINE HYDROCHLORIDE 10 MG/ML
30 INJECTION, SOLUTION EPIDURAL; INFILTRATION; INTRACAUDAL; PERINEURAL PRN
Status: DISCONTINUED | OUTPATIENT
Start: 2024-04-17 | End: 2024-04-17

## 2024-04-17 RX ORDER — SEVOFLURANE 250 ML/250ML
1 LIQUID RESPIRATORY (INHALATION) CONTINUOUS PRN
Status: DISCONTINUED | OUTPATIENT
Start: 2024-04-17 | End: 2024-04-17

## 2024-04-17 RX ORDER — CARBOPROST TROMETHAMINE 250 UG/ML
250 INJECTION, SOLUTION INTRAMUSCULAR PRN
Status: DISCONTINUED | OUTPATIENT
Start: 2024-04-17 | End: 2024-04-17

## 2024-04-17 RX ORDER — NALBUPHINE HYDROCHLORIDE 10 MG/ML
10 INJECTION, SOLUTION INTRAMUSCULAR; INTRAVENOUS; SUBCUTANEOUS
Status: DISCONTINUED | OUTPATIENT
Start: 2024-04-17 | End: 2024-04-17

## 2024-04-17 RX ORDER — MISOPROSTOL 100 UG/1
400 TABLET ORAL PRN
Status: DISCONTINUED | OUTPATIENT
Start: 2024-04-17 | End: 2024-04-17

## 2024-04-17 RX ORDER — SODIUM CHLORIDE, SODIUM LACTATE, POTASSIUM CHLORIDE, AND CALCIUM CHLORIDE .6; .31; .03; .02 G/100ML; G/100ML; G/100ML; G/100ML
1000 INJECTION, SOLUTION INTRAVENOUS PRN
Status: DISCONTINUED | OUTPATIENT
Start: 2024-04-17 | End: 2024-04-17

## 2024-04-17 RX ORDER — ONDANSETRON 4 MG/1
4 TABLET, ORALLY DISINTEGRATING ORAL EVERY 6 HOURS PRN
Status: DISCONTINUED | OUTPATIENT
Start: 2024-04-17 | End: 2024-04-17

## 2024-04-17 RX ORDER — DOCUSATE SODIUM 100 MG/1
100 CAPSULE, LIQUID FILLED ORAL 2 TIMES DAILY PRN
Status: DISCONTINUED | OUTPATIENT
Start: 2024-04-17 | End: 2024-04-18 | Stop reason: HOSPADM

## 2024-04-17 RX ORDER — METHYLERGONOVINE MALEATE 0.2 MG/ML
200 INJECTION INTRAVENOUS PRN
Status: DISCONTINUED | OUTPATIENT
Start: 2024-04-17 | End: 2024-04-18 | Stop reason: HOSPADM

## 2024-04-17 RX ORDER — MODIFIED LANOLIN
OINTMENT (GRAM) TOPICAL PRN
Status: DISCONTINUED | OUTPATIENT
Start: 2024-04-17 | End: 2024-04-18 | Stop reason: HOSPADM

## 2024-04-17 RX ORDER — ONDANSETRON 2 MG/ML
4 INJECTION INTRAMUSCULAR; INTRAVENOUS EVERY 6 HOURS PRN
Status: DISCONTINUED | OUTPATIENT
Start: 2024-04-17 | End: 2024-04-17

## 2024-04-17 RX ORDER — SODIUM CHLORIDE 0.9 % (FLUSH) 0.9 %
5-40 SYRINGE (ML) INJECTION EVERY 12 HOURS SCHEDULED
Status: DISCONTINUED | OUTPATIENT
Start: 2024-04-17 | End: 2024-04-18 | Stop reason: HOSPADM

## 2024-04-17 RX ORDER — SODIUM CHLORIDE 0.9 % (FLUSH) 0.9 %
5-40 SYRINGE (ML) INJECTION EVERY 12 HOURS SCHEDULED
Status: DISCONTINUED | OUTPATIENT
Start: 2024-04-17 | End: 2024-04-17

## 2024-04-17 RX ORDER — ACETAMINOPHEN 500 MG
1000 TABLET ORAL EVERY 8 HOURS
Status: DISCONTINUED | OUTPATIENT
Start: 2024-04-17 | End: 2024-04-18 | Stop reason: HOSPADM

## 2024-04-17 RX ADMIN — Medication: at 19:53

## 2024-04-17 RX ADMIN — SODIUM CHLORIDE, POTASSIUM CHLORIDE, SODIUM LACTATE AND CALCIUM CHLORIDE: 600; 310; 30; 20 INJECTION, SOLUTION INTRAVENOUS at 12:03

## 2024-04-17 RX ADMIN — Medication 999 ML/HR: at 17:29

## 2024-04-17 RX ADMIN — Medication 1 MILLI-UNITS/MIN: at 12:05

## 2024-04-17 RX ADMIN — ACETAMINOPHEN 1000 MG: 500 TABLET ORAL at 20:34

## 2024-04-17 RX ADMIN — LIDOCAINE HYDROCHLORIDE 30 ML: 10 INJECTION, SOLUTION EPIDURAL; INFILTRATION; INTRACAUDAL; PERINEURAL at 17:35

## 2024-04-17 RX ADMIN — IBUPROFEN 800 MG: 800 TABLET, FILM COATED ORAL at 19:10

## 2024-04-17 ASSESSMENT — PAIN DESCRIPTION - LOCATION: LOCATION: ABDOMEN;PERINEUM

## 2024-04-17 ASSESSMENT — PAIN DESCRIPTION - DESCRIPTORS: DESCRIPTORS: CRAMPING;SORE

## 2024-04-17 ASSESSMENT — PAIN DESCRIPTION - ORIENTATION: ORIENTATION: LOWER

## 2024-04-17 ASSESSMENT — PAIN SCALES - GENERAL: PAINLEVEL_OUTOF10: 6

## 2024-04-17 NOTE — H&P
Social Connections: Not on file   Intimate Partner Violence: Not on file   Housing Stability: Not on file     Family History:       Problem Relation Age of Onset    No Known Problems Paternal Grandfather     No Known Problems Paternal Grandmother     Alzheimer's Disease Maternal Grandmother     Heart Surgery Maternal Grandfather     No Known Problems Father     No Known Problems Mother     No Known Problems Brother     No Known Problems Sister      Medications Prior to Admission:  Medications Prior to Admission: Magnesium 100 MG TABS,   aspirin 81 MG chewable tablet, Take 1 tablet by mouth daily  Prenatal MV-Min-Fe Fum-FA-DHA (PRENATAL 1 PO), Take by mouth  Omega-3 Fatty Acids (FISH OIL PO), Take by mouth  Ascorbic Acid (VITAMIN C ER PO), Take by mouth     REVIEW OF SYSTEMS:    CONSTITUTIONAL:  negative  RESPIRATORY:  negative  CARDIOVASCULAR:  negative  GASTROINTESTINAL:  negative  ALLERGIC/IMMUNOLOGIC:  negative  NEUROLOGICAL:  negative  BEHAVIOR/PSYCH:  negative    PHYSICAL EXAM:  Vitals:    04/17/24 1116 04/17/24 1117 04/17/24 1205   BP: 133/88  119/75   Pulse: 71  60   Resp: 18  16   Temp: 97.8 °F (36.6 °C)  97.7 °F (36.5 °C)   TempSrc: Oral  Oral   SpO2: 100% 100% 98%   Weight:  65.8 kg (145 lb)    Height:  1.651 m (5' 5\")      General appearance:  awake, alert, cooperative, no apparent distress, and appears stated age  Neurologic:  Awake, alert, oriented to name, place and time.    Lungs:  No increased work of breathing, good air exchange  Abdomen:  Soft, non tender, gravid, consistent with her gestational age, EFW by Leopald's manouever was 6-7.5#  Fetal heart rate:  Reassuring.  Pelvis:  Adequate pelvis  Cervix: 2-3cm 50% soft -2  Contraction frequency:  no regular uterine activity    Membranes:  Ruptured with consent and without difficulty for return of odorless clear fluid    Labs: CBC:   Lab Results   Component Value Date/Time    WBC 6.4 04/17/2024 11:40 AM    RBC 4.02 04/17/2024 11:40 AM    HGB 12.6  2024 11:40 AM    HCT 36.9 2024 11:40 AM    MCV 91.8 2024 11:40 AM    MCH 31.3 2024 11:40 AM    MCHC 34.1 2024 11:40 AM    RDW 11.8 2024 11:40 AM     2024 11:40 AM    MPV 10.7 2024 11:40 AM       ASSESSMENT AND PLAN:    Estimated length of stay: 2 midnights or less    Active Problems:  Term Pregnancy - anticipate       Labor: Admit, anticipate normal delivery, routine labor orders  Fetus: Reassuring, Cat 1  GBS: No  Other: pitocin, Epidural if desires  Dr Caraballo updated and agreeable with plan of care      MAEVE IBANEZ - MANNIE,C.N.M,2024 12:11 PM

## 2024-04-17 NOTE — PROGRESS NOTES
B. Gerard CNM at bedside at this time for SVE and AROM. Okay to begin pitocin infusion as ordered.

## 2024-04-17 NOTE — FLOWSHEET NOTE
Pt educated on the use of self-administered  nitrous oxide for pain control and side effects. Pt educated on when and how to use the mask appropriately. Pt educated that she is the only person allowed to hold the mask to her face and that no one should prop the mask against her face. Pt also educated that she is the only person in the room allowed to use the mask.Pt informed that she cannot be out of bed without a trained support person with her. Pt completed a return demonstration of the use of nitrous oxide and all questions and concerns were addressed. RN verified the presence of a signed agreement form for the nitrous oxide. Pre-intervention VS, assessment, and FHT'st as charted. Nitrous oxide and oxygen at a 50-50 mix was initiated at 1414. RN remains at bedside for the first 15 mins post initiation. Pt has experienced none as side effects at this time.

## 2024-04-17 NOTE — FLOWSHEET NOTE
RN called BRashid Gee CNM about patient pain level and request for nitrous oxide. CONNIE Gee OK with nitrous on way for SVE.

## 2024-04-17 NOTE — L&D DELIVERY NOTE
Lay, Girl Nai [[id number]]      Labor Events     Labor: No   Steroids: None  Cervical Ripening Date/Time:      Antibiotics Received during Labor: No  Rupture Date/Time:  24 12:02:00   Rupture Type: AROM, Intact  Fluid Color: Clear  Fluid Odor: None  Fluid Volume: Moderate  Induction: AROM, Oxytocin  Labor Complications: None              Anesthesia    Method: Nitrous Oxide       Labor Event Times      Labor onset date/time:        Dilation complete date/time:  24 17:21:00 EDT     Start pushing date/time:  2024 17:21:00   Decision date/time (emergent ):            Labor Length    2nd stage: 0h 05m  3rd stage: 0h 04m       Delivery Details      Delivery Date: 24 Delivery Time: 17:26:00   Delivery Type: Vaginal, Spontaneous               Presentation    Presentation: Vertex  _: Occiput  _: Anterior       Shoulder Dystocia    Shoulder Dystocia Present?: No       Assisted Delivery Details    Forceps Attempted?: No  Vacuum Extractor Attempted?: No                           Cord    Vessels: 3 Vessels  Complications: Nuchal Loose  Cord Around: Head, Right Upper Extremity  Delayed Cord Clamping?: Yes  Cord Clamped Date/Time: 2024 17:28:00  Cord Blood Disposition: Lab  Gases Sent?: No   Cord Comments:  PROCEDURAL - OBSTETRIC - CORD OBSERVATION   cord blood and cord segment sent to lab             Placenta    Date/Time: 2024 17:30:00  Removal: Spontaneous  Appearance: Intact  Disposition: Discarded       Lacerations    Episiotomy: None  Perineal Lacerations: 2nd  Other Lacerations: no non-perineal laceration  Number of Repair Packets: 1       Vaginal Counts    Initial Count Personnel: FARHAD POND RN  Initial Count Verified By: DILIP SUMNER RN  Intial Sponge Count: Correct  Intial Instruments Count: Correct   Final Sponges Count: Correct Final Needles  Count: Correct Final Instruments Count: Correct   Final Count Personnel: DILIP SUMNER RN  Final Count Verified By: CONNIE WILLIS  MANNIE  Accurate Final Count?: Yes       Blood Loss  Mother: Nai Felix N #[id number]     Start of Mother's Information      Delivery Blood Loss  24 0526 - 24 0736      None                 End of Mother's Information  Mother: Nai Felix N #[id number]                Delivery Providers    Delivering clinician: Jeni Gee APRN - CNM     Provider Role     Obstetrician    Malorie Leal, RN Primary Nurse    Asya Andrade, RN Primary [place] Nurse     NICU Nurse     Neonatologist     Anesthesiologist     Nurse Anesthetist     Nurse Practitioner    Jeni Gee APRN - CNM Midwife     Nursery Nurse               Assessment    Living Status: Living  Delivery Location Comment: 204        Skin Color:   Heart Rate:   Reflex Irritability:   Muscle Tone:   Respiratory Effort:   Total:            1 Minute:    1    2    2    2    1    8         5 Minute:    1    2    2    2    2    9                                        Apgars Assigned By: FARHAD LEAL RN              Resuscitation    Method: Bulb Suction             [place] Measurements      Birth Weight: 3062 g   Birth Length: 52.1 cm     Head Circumference: 34.5 cm              Skin to Skin      Skin to Skin Initiation Date/Time: 24 17:26:00 EDT     Skin to Skin With: Mother     Breastfeeding Initiated Date/Time: 2024 18:28:00

## 2024-04-17 NOTE — FLOWSHEET NOTE
Pt reports pain scale of 6/10. She states that she has use the nitrous with approximately 90% of her contractions. Pt reports that the nitrous is helping with pain control of her contractions.

## 2024-04-17 NOTE — FLOWSHEET NOTE
Pt reports pain scale of 7/10. She states that she has use the nitrous with approximately 25% of her contractions. Pt reports that the nitrous is somewhat helping with pain control of her contractions.

## 2024-04-17 NOTE — FLOWSHEET NOTE
Self-administered nitrous oxide discontinued at 1700  due to patient mask fell on floor and patient states she has doesn't want it anymore . Pt assessment, VS, and FHT's as charted.

## 2024-04-17 NOTE — PROGRESS NOTES
Department of Obstetrics and Gynecology   Progress Note      SUBJECTIVE:  Patient having more discomfort with contractions, would like to consider nitrous oxide.  Consents to cervical exam prior    OBJECTIVE:      Vitals:    04/17/24 1116 04/17/24 1117 04/17/24 1205 04/17/24 1345   BP: 133/88  119/75 116/73   Pulse: 71  60 58   Resp: 18  16    Temp: 97.8 °F (36.6 °C)  97.7 °F (36.5 °C)    TempSrc: Oral  Oral    SpO2: 100% 100% 98%    Weight:  65.8 kg (145 lb)     Height:  1.651 m (5' 5\")         Fetal heart rate:       Category 1 tracing    Contraction frequency: 4 minutes    Membranes:  Ruptured clear fluid    Cervix:         Dilation:  4-5cm         Effacement:  50%         Station:  -2         Position:  posterior             ASSESSMENT & PLAN:    Okay for nitrous oxide, continue pitocin per protocol

## 2024-04-17 NOTE — PROGRESS NOTES
Patient arrives ambulatory to unit at this time for scheduled IOL. Pt escorted to 204 and instructed to change into a gown and provide urine sample.

## 2024-04-18 VITALS
WEIGHT: 145 LBS | HEART RATE: 66 BPM | SYSTOLIC BLOOD PRESSURE: 115 MMHG | RESPIRATION RATE: 16 BRPM | HEIGHT: 65 IN | TEMPERATURE: 97.5 F | DIASTOLIC BLOOD PRESSURE: 73 MMHG | OXYGEN SATURATION: 97 % | BODY MASS INDEX: 24.16 KG/M2

## 2024-04-18 LAB
COMPONENT: NORMAL
RESULT: NEGATIVE
STATUS OF UNITS: NORMAL
TRANSFUSION STATUS: NORMAL
UNIT DIVISION: 0
UNIT NUMBER: NORMAL

## 2024-04-18 PROCEDURE — 6370000000 HC RX 637 (ALT 250 FOR IP): Performed by: ADVANCED PRACTICE MIDWIFE

## 2024-04-18 PROCEDURE — 99024 POSTOP FOLLOW-UP VISIT: CPT | Performed by: ADVANCED PRACTICE MIDWIFE

## 2024-04-18 RX ADMIN — ACETAMINOPHEN 1000 MG: 500 TABLET ORAL at 07:32

## 2024-04-18 RX ADMIN — DOCUSATE SODIUM 100 MG: 100 CAPSULE, LIQUID FILLED ORAL at 07:34

## 2024-04-18 RX ADMIN — Medication: at 11:15

## 2024-04-18 RX ADMIN — IBUPROFEN 800 MG: 800 TABLET, FILM COATED ORAL at 04:07

## 2024-04-18 RX ADMIN — IBUPROFEN 800 MG: 800 TABLET, FILM COATED ORAL at 12:07

## 2024-04-18 RX ADMIN — ACETAMINOPHEN 1000 MG: 500 TABLET ORAL at 16:31

## 2024-04-18 RX ADMIN — Medication: at 11:09

## 2024-04-18 ASSESSMENT — PAIN SCALES - GENERAL
PAINLEVEL_OUTOF10: 3
PAINLEVEL_OUTOF10: 2
PAINLEVEL_OUTOF10: 2
PAINLEVEL_OUTOF10: 4
PAINLEVEL_OUTOF10: 3

## 2024-04-18 ASSESSMENT — PAIN DESCRIPTION - DESCRIPTORS
DESCRIPTORS: SORE
DESCRIPTORS: SORE
DESCRIPTORS: CRAMPING

## 2024-04-18 ASSESSMENT — PAIN DESCRIPTION - LOCATION
LOCATION: ABDOMEN
LOCATION: PERINEUM

## 2024-04-18 ASSESSMENT — PAIN DESCRIPTION - ORIENTATION
ORIENTATION: LOWER
ORIENTATION: LOWER

## 2024-04-18 NOTE — PLAN OF CARE
Problem: Vaginal Birth or  Section  Goal: Fetal and maternal status remain reassuring during the birth process  Description:  Birth OB-Pregnancy care plan goal which identifies if the fetal and maternal status remain reassuring during the birth process  2024 by Brittany Lopez RN  Outcome: Completed     Problem: Postpartum  Goal: Experiences normal postpartum course  Description:  Postpartum OB-Pregnancy care plan goal which identifies if the mother is experiencing a normal postpartum course  2024 08 by Debora Mcnair RN  Outcome: Progressing  2024 by Brittany Lopez RN  Outcome: Progressing  Goal: Appropriate maternal -  bonding  Description:  Postpartum OB-Pregnancy care plan goal which identifies if the mother and  are bonding appropriately  2024 by Debora Mcnair RN  Outcome: Progressing  2024 by Brittany Lopez RN  Outcome: Progressing  Goal: Establishment of infant feeding pattern  Description:  Postpartum OB-Pregnancy care plan goal which identifies if the mother is establishing a feeding pattern with their   2024 by Debora Mcnair RN  Outcome: Progressing  2024 by Brittany Lopez RN  Outcome: Progressing  Goal: Incisions, wounds, or drain sites healing without S/S of infection  2024 by Debora Mcnair RN  Outcome: Progressing  2024 by Brittany Lopez RN  Outcome: Progressing     Problem: Pain  Goal: Verbalizes/displays adequate comfort level or baseline comfort level  2024 08 by Debora Mcnair RN  Outcome: Progressing  2024 by Brittany Lopez RN  Outcome: Progressing  Flowsheets (Taken 2024 1735 by Malorie Leal, RN)  Verbalizes/displays adequate comfort level or baseline comfort level:   Encourage patient to monitor pain and request assistance   Assess pain using appropriate pain scale   Administer analgesics based on type and severity of pain and

## 2024-04-18 NOTE — PROGRESS NOTES
Department of Obstetrics and Gynecology  Labor and Delivery       Post Partum Progress Note             SUBJECTIVE:  PT doing well at this time. Pt would like to go home this evening. Pt states she is breastfeeding with ease.    OBJECTIVE:      Vitals:  /73   Pulse 61   Temp 97.4 °F (36.3 °C) (Oral)   Resp 16   Ht 1.651 m (5' 5\")   Wt 65.8 kg (145 lb)   LMP 07/17/2023 (Exact Date)   SpO2 99%   Breastfeeding Unknown   BMI 24.13 kg/m²   Patient Vitals for the past 24 hrs:   BP Temp Temp src Pulse Resp SpO2 Height Weight   04/18/24 0752 131/73 97.4 °F (36.3 °C) Oral 61 16 99 % -- --   04/18/24 0358 124/60 97.8 °F (36.6 °C) Oral 54 16 96 % -- --   04/17/24 2332 114/60 98.5 °F (36.9 °C) Oral 62 14 95 % -- --   04/17/24 1920 115/65 98.2 °F (36.8 °C) Oral 65 16 -- -- --   04/17/24 1905 116/62 -- -- 62 -- -- -- --   04/17/24 1851 119/62 -- -- 66 -- -- -- --   04/17/24 1835 116/64 -- -- 63 -- -- -- --   04/17/24 1820 121/61 -- -- 58 -- -- -- --   04/17/24 1805 (!) 124/56 -- -- 64 -- -- -- --   04/17/24 1750 134/63 -- -- 71 -- -- -- --   04/17/24 1735 126/66 97.6 °F (36.4 °C) Oral 76 16 -- -- --   04/17/24 1720 (!) 143/74 -- -- 78 -- -- -- --   04/17/24 1718 -- -- -- -- -- 96 % -- --   04/17/24 1654 (!) 115/57 -- -- 74 -- -- -- --   04/17/24 1623 (!) 114/59 -- -- 75 -- -- -- --   04/17/24 1545 (!) 142/64 -- -- 68 -- -- -- --   04/17/24 1515 120/71 -- -- 67 -- -- -- --   04/17/24 1445 126/77 -- -- 56 -- -- -- --   04/17/24 1415 138/67 -- -- 61 -- -- -- --   04/17/24 1345 116/73 -- -- 58 -- -- -- --   04/17/24 1205 119/75 97.7 °F (36.5 °C) Oral 60 16 98 % -- --   04/17/24 1117 -- -- -- -- -- 100 % 1.651 m (5' 5\") 65.8 kg (145 lb)   04/17/24 1116 133/88 97.8 °F (36.6 °C) Oral 71 18 100 % -- --         ABDOMEN: normal shape, position and consistency  GENITAL/URINARY:  External Genitalia:  General appearance; normal, Hair distribution; normal, Lesions absent  Uterus:  Size normal, Contour normal  Breast:normal

## 2024-04-18 NOTE — LACTATION NOTE
Lactation education:    [x] Latch/ good latch vs shallow latch/ steps to obtaining deep latch    [x] How to know if infant is eating enough/ feedings per 24 hours, wet/dirty diapers    [x] Feeding/satiety cues      Lactation education resources given:     [x]  How to Breastfeed your baby - [facility] publication      [x]  Follow up support information    [x]  Breast milk storage guidelines - [facility]    [x]  Breastpump cleaning guidelines - [facility]     [x]  Breastfeeding & Safe Sleep handout - [facility] publication    [x]  Calling All Dads! Handout - [facility] publication      []  Breast and Nipple Care - [facility]     []  Breastmilk Collection & Storage - [facility]    []  Breastpump Kit Care - [facility]    []  Going Back to Work - [facility]    []  Preventing Engorgement - [facility]    Supplies given:    []  Brush, soap and basin for breastpump cleaning    []  Insurance pump provided     []  [facility] pump set up for patient to use    Explained to patient, patient verbalizes understanding.        Signed:  Dulce Scales RN, BSN, IBCLC

## 2024-04-18 NOTE — PROGRESS NOTES
Pt's breakfast arrives, writer instructs her to put on call light for assessment when she is finished eating. Dominic ferrer.

## 2024-04-18 NOTE — DISCHARGE INSTRUCTIONS
Follow-up with your OB doctor as specified.    [facility] OB Department phone: [phone]    Dr. John Paul Corbett [facility]  Dr. Rachelle Jones CN  [address]  [place] [address]   [place] [phone]   or [place] [phone]     Dr Rachelle TRAORE  Jeni Gee CN  [address] [address] [phone]      DIET  Eat a well balanced diet focusing on foods high in fiber and protein.   Drink plenty of fluids especially water.  To avoid constipation you may take a mild stool softener as recommended by your doctor or midwife.    ACTIVITY  Gradually increase your activity.  Resume exercise regimen only after advice by your doctor or midwife.  Avoid lifting anything heavier than a gallon of milk for SIX weeks.   Avoid driving until your doctor or midwife has given their approval.  Rise slowly from a lying to sitting and then a standing position.  Climb stairs one at a time.  Use caution when carrying your baby up and down the stairs.  NO SEXUAL Activity for 6 weeks or until advised by your doctor; Nothing in vagina: intercourse, tampons, or douching. No swimming or tub baths x 6 weeks.  Be prepared to discuss family planning at your follow-up OB visit.   You may feel tired or have a lack of energy.  You may continue your prenatal vitamin to replenish nutrients post delivery.  Nap when baby naps to catch up on sleep.     EMOTIONS  You may feel wiggins, sad, teary, & overwhelmed.  Contact your OB provider if you feel you may be showing signs of postpartum depression, or have thoughts of harming yourself or your infant.  If infant will not stop crying, contact another adult for help or place infant in their crib on their back and take a break.  NEVER shake your infant.      BLEEDING  Vaginal bleeding will decrease in amount over the next few weeks.  You will notice that as your activity increases, your flow may increase.  This is your body's way of telling you, you  pad in an hour.  Your abdomen is tender to touch.  You are passing blood clots bigger than the size of a lemon.  If you are experiencing extreme weakness or dizziness.   If you are having flu-like symptoms such as achy muscles or joints.  There is a foul smell or a green color to your vaginal bleeding.  If you have pain that cannot be relieved.  You have persistent burning or frequency with urination.  Call if you have concerns about your well-being.  You are unable to sleep, eat, or are having thoughts of harming yourself or your baby.   You have swelling, bleeding, drainage, foul odor, redness, or warmth in/around your incision or stitches.  You have a red, warm, tender area in your calf.

## 2024-04-18 NOTE — PLAN OF CARE
Problem: Postpartum  Goal: Experiences normal postpartum course  Description:  Postpartum OB-Pregnancy care plan goal which identifies if the mother is experiencing a normal postpartum course  2024 by Brittany Lopez RN  Outcome: Progressing  2024 by Malorie eLal RN  Outcome: Progressing  Goal: Appropriate maternal -  bonding  Description:  Postpartum OB-Pregnancy care plan goal which identifies if the mother and  are bonding appropriately  2024 by Brittany Lopez RN  Outcome: Progressing  2024 143 by Malorie Leal RN  Outcome: Progressing  Goal: Establishment of infant feeding pattern  Description:  Postpartum OB-Pregnancy care plan goal which identifies if the mother is establishing a feeding pattern with their   2024 by Brittany Lopez RN  Outcome: Progressing  2024 by Malorie Leal RN  Outcome: Progressing  Goal: Incisions, wounds, or drain sites healing without S/S of infection  2024 by Brittany Lopez RN  Outcome: Progressing  2024 143 by Malorie Leal RN  Outcome: Progressing     Problem: Pain  Goal: Verbalizes/displays adequate comfort level or baseline comfort level  2024 by Brittany Lopez RN  Outcome: Progressing  Flowsheets (Taken 2024 173 by Malorie Leal RN)  Verbalizes/displays adequate comfort level or baseline comfort level:   Encourage patient to monitor pain and request assistance   Assess pain using appropriate pain scale   Administer analgesics based on type and severity of pain and evaluate response   Implement non-pharmacological measures as appropriate and evaluate response   Consider cultural and social influences on pain and pain management   Notify Licensed Independent Practitioner if interventions unsuccessful or patient reports new pain  2024 143 by Malorie Leal RN  Outcome: Progressing     Problem: Infection - Adult  Goal: Absence of

## 2024-04-18 NOTE — DISCHARGE SUMMARY
Obstetrical Discharge Form        Gestational Age:39w2d    Antepartum complications: none    Date of Delivery: 24    Type of Delivery:        Delivered By:  CONNIE MCFARLANE CNM    Assisted By:N/A}      Baby: female    Anesthesia:  nitrous oxide      Intrapartum complications: None    Feeding method: breast    Blood type: A NEGATIVE      Rubella:    Rubella Antibody, IgG   Date Value Ref Range Status   2023 380.1 IU/mL Final     Comment:                 REFERENCE RANGE:  <5.0       NON-REACTIVE (non-immune)  5.0 TO 9.9 EQUIVOCAL  >=10.0     REACTIVE     (immune)             T. Pallidium, IGG:    T. pallidum, IgG   Date Value Ref Range Status   2023 NONREACTIVE NONREACTIVE Final     Comment:           T. pallidum antibodies are not detected.  There is no serological evidence of infection with T. pallidum (early primary syphilis   cannot be excluded).  Retest in 2-4 weeks if syphilis is clinically suspect.             Hepatitis B Surface Antigen:   Hepatitis B Surface Ag   Date Value Ref Range Status   2023 NONREACTIVE NONREACTIVE Final     HIV:    HIV Ag/Ab   Date Value Ref Range Status   2023 NONREACTIVE NONREACTIVE Final     Comment:     No laboratory evidence of HIV infection.  If acute HIV infection is suspected, consider   testing for HIV-1 RNA.         Results for orders placed or performed during the hospital encounter of 24   CBC auto differential   Result Value Ref Range    WBC 6.4 3.5 - 11.3 k/uL    RBC 4.02 3.95 - 5.11 m/uL    Hemoglobin 12.6 11.9 - 15.1 g/dL    Hematocrit 36.9 36.3 - 47.1 %    MCV 91.8 82.6 - 102.9 fL    MCH 31.3 25.2 - 33.5 pg    MCHC 34.1 28.4 - 34.8 g/dL    RDW 11.8 11.8 - 14.4 %    Platelets 175 138 - 453 k/uL    MPV 10.7 8.1 - 13.5 fL    NRBC Automated 0.0 0.0 per 100 WBC    Neutrophils % 66 (H) 36 - 65 %    Lymphocytes % 24 24 - 43 %    Monocytes % 8 3 - 12 %    Eosinophils % 1 1 - 4 %    Basophils % 1 0 - 2 %    Immature Granulocytes % 0 0 %

## 2024-04-19 NOTE — FLOWSHEET NOTE
Patient discharged from unit to home at this time.Patient verbalized understanding of all d/c instructions. Patient ambulatory and A&Ox4 at time of d/c accompanied by FOB and  infant in carseat. No s/s of distress noted at time of discharge.

## 2024-04-30 ENCOUNTER — POSTPARTUM VISIT (OUTPATIENT)
Dept: OBGYN CLINIC | Age: 34
End: 2024-04-30

## 2024-04-30 VITALS
BODY MASS INDEX: 22.33 KG/M2 | DIASTOLIC BLOOD PRESSURE: 56 MMHG | SYSTOLIC BLOOD PRESSURE: 96 MMHG | WEIGHT: 134 LBS | HEIGHT: 65 IN

## 2024-04-30 PROCEDURE — 99024 POSTOP FOLLOW-UP VISIT: CPT | Performed by: ADVANCED PRACTICE MIDWIFE

## 2024-04-30 RX ORDER — AMOXICILLIN 875 MG/1
TABLET, COATED ORAL
COMMUNITY
Start: 2024-04-28

## 2024-04-30 RX ORDER — CYANOCOBALAMIN (VITAMIN B-12) 500 MCG
TABLET ORAL
COMMUNITY

## 2024-04-30 RX ORDER — ASCORBIC ACID 500 MG
500 TABLET ORAL DAILY
COMMUNITY

## 2024-04-30 NOTE — PROGRESS NOTES
POSTPARTUM EXAM    Date of service: 2024    Nai Felix  Is a 33 y.o.  female    PT's PCP is: No primary care provider on file.     : 1990     OB History    Para Term  AB Living   3 3 3     3   SAB IAB Ectopic Molar Multiple Live Births           0 3      # Outcome Date GA Lbr Reginald/2nd Weight Sex Delivery Anes PTL Lv   3 Term 24 39w2d / 00:05 3.062 kg (6 lb 12 oz) F Vag-Spont Nitrous Oxid N FILIPPO   2 Term 03/10/22 38w4d 05:44 / 00:18 3.445 kg (7 lb 9.5 oz) M Vag-Spont EPI N FILIPPO   1 Term 07/24/15 40w0d  3.374 kg (7 lb 7 oz) M Vag-Spont EPI N FILIPPO        Social History     Tobacco Use   Smoking Status Never   Smokeless Tobacco Never         Social History     Substance and Sexual Activity   Alcohol Use Not Currently    Comment: rare         Delivery date 2024    Type of delivery:  Spontaneous vaginal delivery    Laceration:Yes, Second degree laceration.      Infant gender: female    Infant name: Sara    Are you breast or bottle feeding?  Breast    Have you been sexually active since delivery? No    Vital Signs: Blood pressure (!) 96/56, height 1.651 m (5' 5\"), weight 60.8 kg (134 lb), last menstrual period 2023, currently breastfeeding.     Labs:    Blood Type and Rh: A NEGATIVE          Allergies: Ceclor [cefaclor]      Current Outpatient Medications:     vitamin C (ASCORBIC ACID) 500 MG tablet, Take 1 tablet by mouth daily, Disp: , Rfl:     Omega-3 Fatty Acids (FISH OIL) 300 MG CAPS, Take by mouth, Disp: , Rfl:     amoxicillin (AMOXIL) 875 MG tablet, , Disp: , Rfl:     Prenatal MV-Min-Fe Fum-FA-DHA (PRENATAL 1 PO), Take by mouth, Disp: , Rfl:       Chief Complaint   Patient presents with    Postpartum Care     2 week PPV. 2nd degree tear. Breastfeeding. Still slightly bleeding.        How many Hours of sleep do you get a night: good - last night woke once    Do you have a normal appetite: yes    Any problems or pain: recently seen at urgent care for URI - on abx and

## 2024-05-30 ENCOUNTER — POSTPARTUM VISIT (OUTPATIENT)
Dept: OBGYN CLINIC | Age: 34
End: 2024-05-30

## 2024-05-30 VITALS
DIASTOLIC BLOOD PRESSURE: 60 MMHG | HEIGHT: 65 IN | SYSTOLIC BLOOD PRESSURE: 90 MMHG | WEIGHT: 134 LBS | BODY MASS INDEX: 22.33 KG/M2

## 2024-05-30 DIAGNOSIS — O92.70 LACTATION DISORDER: ICD-10-CM

## 2024-05-30 PROCEDURE — 99024 POSTOP FOLLOW-UP VISIT: CPT | Performed by: ADVANCED PRACTICE MIDWIFE

## 2024-05-30 RX ORDER — MAGNESIUM GLYCINATE 100 MG
CAPSULE ORAL
COMMUNITY

## 2024-05-30 NOTE — PROGRESS NOTES
POSTPARTUM EXAM    Date of service: 2024    Nai Felix  Is a 33 y.o.  female    PT's PCP is: No primary care provider on file.     : 1990     OB History    Para Term  AB Living   3 3 3     3   SAB IAB Ectopic Molar Multiple Live Births           0 3      # Outcome Date GA Lbr Reginald/2nd Weight Sex Delivery Anes PTL Lv   3 Term 24 39w2d / 00:05 3.062 kg (6 lb 12 oz) F Vag-Spont Nitrous Oxid N FILIPPO   2 Term 03/10/22 38w4d 05:44 / 00:18 3.445 kg (7 lb 9.5 oz) M Vag-Spont EPI N FILIPPO   1 Term 07/24/15 40w0d  3.374 kg (7 lb 7 oz) M Vag-Spont EPI N FILIPPO        Social History     Tobacco Use   Smoking Status Never   Smokeless Tobacco Never         Social History     Substance and Sexual Activity   Alcohol Use Not Currently    Comment: rare         Delivery date 2024    Type of delivery:  Spontaneous vaginal delivery    Laceration:Yes, Second degree laceration.      Infant gender: female    Are you breast or bottle feeding?  Breast    Have you been sexually active since delivery? Yes    Vital Signs: Blood pressure 90/60, height 1.651 m (5' 5\"), weight 60.8 kg (134 lb), last menstrual period 2023, currently breastfeeding.     Labs:    Blood Type and Rh: A NEGATIVE          Allergies: Ceclor [cefaclor]      Current Outpatient Medications:     Magnesium Glycinate 100 MG CAPS, , Disp: , Rfl:     vitamin C (ASCORBIC ACID) 500 MG tablet, Take 1 tablet by mouth daily, Disp: , Rfl:     Omega-3 Fatty Acids (FISH OIL) 300 MG CAPS, Take by mouth, Disp: , Rfl:     Prenatal MV-Min-Fe Fum-FA-DHA (PRENATAL 1 PO), Take by mouth, Disp: , Rfl:     Chief Complaint   Patient presents with    Postpartum Care     2nd degree tear. Breastfeeding. Stopped bleeding about 2.5 weeks PP. Pt has has not resumed cycles. Has resumed intercourse.        How many Hours of sleep do you get a night: wakes every 4-5 hours    Do you have a normal appetite: yes    Any problems or pain: right breast issue    Do you

## 2024-07-15 ENCOUNTER — OFFICE VISIT (OUTPATIENT)
Dept: OBGYN CLINIC | Age: 34
End: 2024-07-15
Payer: COMMERCIAL

## 2024-07-15 VITALS
HEIGHT: 65 IN | WEIGHT: 130 LBS | BODY MASS INDEX: 21.66 KG/M2 | SYSTOLIC BLOOD PRESSURE: 110 MMHG | DIASTOLIC BLOOD PRESSURE: 70 MMHG

## 2024-07-15 DIAGNOSIS — Z01.419 SMEAR, VAGINAL, AS PART OF ROUTINE GYNECOLOGICAL EXAMINATION: Primary | ICD-10-CM

## 2024-07-15 DIAGNOSIS — Z12.72 SMEAR, VAGINAL, AS PART OF ROUTINE GYNECOLOGICAL EXAMINATION: Primary | ICD-10-CM

## 2024-07-15 PROCEDURE — 99395 PREV VISIT EST AGE 18-39: CPT | Performed by: ADVANCED PRACTICE MIDWIFE

## 2024-07-15 ASSESSMENT — ENCOUNTER SYMPTOMS
GASTROINTESTINAL NEGATIVE: 1
ABDOMINAL PAIN: 0
CONSTIPATION: 0
SHORTNESS OF BREATH: 0
DIARRHEA: 0
RESPIRATORY NEGATIVE: 1

## 2024-07-15 NOTE — PROGRESS NOTES
Chaperone for Intimate Exam  Chaperone was offered as part of the rooming process. Patient declined and agrees to continue with exam without a chaperone.       
Yes    Partners: Male       Any bleeding or pain with intercourse: No    Last Yearly:  2023    Last pap: 2022 - normal    Do you do self breast exams: Encouraged    Past Medical History:   Diagnosis Date    Encounter for induction of labor 4/17/2024    Heart abnormality     elevated blood pressure    Pre-eclampsia in third trimester        Past Surgical History:   Procedure Laterality Date    WISDOM TOOTH EXTRACTION Bilateral 2012       Family History   Problem Relation Age of Onset    No Known Problems Paternal Grandfather     No Known Problems Paternal Grandmother     Alzheimer's Disease Maternal Grandmother     Heart Surgery Maternal Grandfather     No Known Problems Father     No Known Problems Mother     No Known Problems Brother     No Known Problems Sister        Chief Complaint   Patient presents with    Annual Exam     Pap NL 7/5/22. Denies concerns.        Labs:    No results found for this visit on 07/15/24.      HPI:  Annual.  Denies breast/pelvic concerns.  Menses suppressed while continues to breastfeed.  Pap normal in 2022.  Monogamous relationship    Review of Systems   Constitutional: Negative.  Negative for chills, fatigue and fever.   HENT: Negative.     Respiratory: Negative.  Negative for shortness of breath.    Cardiovascular: Negative.  Negative for chest pain.   Gastrointestinal: Negative.  Negative for abdominal pain, constipation and diarrhea.   Genitourinary:  Negative for dysuria, enuresis, frequency, menstrual problem, pelvic pain, urgency and vaginal bleeding.   Musculoskeletal: Negative.    Neurological: Negative.  Negative for dizziness, light-headedness and headaches.   Psychiatric/Behavioral: Negative.           Objective  Blood pressure 110/70, height 1.651 m (5' 5\"), weight 59 kg (130 lb), currently breastfeeding.  Physical Exam  Constitutional:       Appearance: Normal appearance. She is normal weight.   Genitourinary:      Vulva, bladder and urethral meatus normal.      No

## 2025-03-27 ENCOUNTER — OFFICE VISIT (OUTPATIENT)
Dept: OBGYN CLINIC | Age: 35
End: 2025-03-27
Payer: COMMERCIAL

## 2025-03-27 VITALS
HEIGHT: 65 IN | WEIGHT: 129 LBS | SYSTOLIC BLOOD PRESSURE: 100 MMHG | DIASTOLIC BLOOD PRESSURE: 64 MMHG | BODY MASS INDEX: 21.49 KG/M2

## 2025-03-27 DIAGNOSIS — N92.4 EXCESSIVE BLEEDING IN PREMENOPAUSAL PERIOD: Primary | ICD-10-CM

## 2025-03-27 PROCEDURE — G8420 CALC BMI NORM PARAMETERS: HCPCS | Performed by: ADVANCED PRACTICE MIDWIFE

## 2025-03-27 PROCEDURE — 1036F TOBACCO NON-USER: CPT | Performed by: ADVANCED PRACTICE MIDWIFE

## 2025-03-27 PROCEDURE — G8427 DOCREV CUR MEDS BY ELIG CLIN: HCPCS | Performed by: ADVANCED PRACTICE MIDWIFE

## 2025-03-27 PROCEDURE — 99213 OFFICE O/P EST LOW 20 MIN: CPT | Performed by: ADVANCED PRACTICE MIDWIFE

## 2025-03-27 SDOH — ECONOMIC STABILITY: INCOME INSECURITY: IN THE LAST 12 MONTHS, WAS THERE A TIME WHEN YOU WERE NOT ABLE TO PAY THE MORTGAGE OR RENT ON TIME?: NO

## 2025-03-27 SDOH — ECONOMIC STABILITY: FOOD INSECURITY: WITHIN THE PAST 12 MONTHS, THE FOOD YOU BOUGHT JUST DIDN'T LAST AND YOU DIDN'T HAVE MONEY TO GET MORE.: NEVER TRUE

## 2025-03-27 SDOH — ECONOMIC STABILITY: FOOD INSECURITY: WITHIN THE PAST 12 MONTHS, YOU WORRIED THAT YOUR FOOD WOULD RUN OUT BEFORE YOU GOT MONEY TO BUY MORE.: NEVER TRUE

## 2025-03-27 SDOH — ECONOMIC STABILITY: TRANSPORTATION INSECURITY
IN THE PAST 12 MONTHS, HAS THE LACK OF TRANSPORTATION KEPT YOU FROM MEDICAL APPOINTMENTS OR FROM GETTING MEDICATIONS?: NO

## 2025-03-27 ASSESSMENT — PATIENT HEALTH QUESTIONNAIRE - PHQ9
1. LITTLE INTEREST OR PLEASURE IN DOING THINGS: NOT AT ALL
SUM OF ALL RESPONSES TO PHQ9 QUESTIONS 1 & 2: 0
SUM OF ALL RESPONSES TO PHQ QUESTIONS 1-9: 0
2. FEELING DOWN, DEPRESSED OR HOPELESS: NOT AT ALL
1. LITTLE INTEREST OR PLEASURE IN DOING THINGS: NOT AT ALL
SUM OF ALL RESPONSES TO PHQ QUESTIONS 1-9: 0
2. FEELING DOWN, DEPRESSED OR HOPELESS: NOT AT ALL
SUM OF ALL RESPONSES TO PHQ QUESTIONS 1-9: 0
SUM OF ALL RESPONSES TO PHQ QUESTIONS 1-9: 0

## 2025-03-27 ASSESSMENT — ENCOUNTER SYMPTOMS
RESPIRATORY NEGATIVE: 1
GASTROINTESTINAL NEGATIVE: 1

## 2025-03-27 NOTE — PROGRESS NOTES
LINOLEIC ACID-SUNFLOWER OIL PO.    No follow-ups on file.    She was also counseled on her preventative health maintenance recommendations and follow-up.     There are no Patient Instructions on file for this visit.    MAEVE IBANEZ CNM,3/27/2025 1:30 PM                     Electronically signed by MAEVE IBANEZ CNM

## 2025-07-29 ASSESSMENT — ENCOUNTER SYMPTOMS
GASTROINTESTINAL NEGATIVE: 1
DIARRHEA: 0
CONSTIPATION: 0
ABDOMINAL PAIN: 0
RESPIRATORY NEGATIVE: 1
SHORTNESS OF BREATH: 0

## 2025-07-29 NOTE — PROGRESS NOTES
YEARLY PHYSICAL    Date of service: 2025    Nai Felix  Is a 34 y.o.   female    PT's PCP is: No primary care provider on file.     : 1990                                             Subjective:       Patient's last menstrual period was 2025 (exact date).     Are your menses regular: yes    OB History    Para Term  AB Living   3 3 3   3   SAB IAB Ectopic Molar Multiple Live Births       0 3      # Outcome Date GA Lbr Reginald/2nd Weight Sex Type Anes PTL Lv   3 Term 24 39w2d / 00:05 3.062 kg (6 lb 12 oz) F Vag-Spont Nitrous Oxid N FILIPPO   2 Term 03/10/22 38w4d 05:44 / 00:18 3.445 kg (7 lb 9.5 oz) M Vag-Spont EPI N FILIPPO   1 Term 07/24/15 40w0d  3.374 kg (7 lb 7 oz) M Vag-Spont EPI N FILIPPO        Social History     Tobacco Use   Smoking Status Never   Smokeless Tobacco Never        Social History     Substance and Sexual Activity   Alcohol Use Not Currently    Comment: rare       Family History   Problem Relation Age of Onset    No Known Problems Paternal Grandfather     No Known Problems Paternal Grandmother     Alzheimer's Disease Maternal Grandmother     Heart Surgery Maternal Grandfather     No Known Problems Father     No Known Problems Mother     No Known Problems Brother     No Known Problems Sister        Any family history of breast or ovarian cancer: No    Any family history of blood clots: No      Allergies: Ceclor [cefaclor]      Current Outpatient Medications:     Magnesium Glycinate 100 MG CAPS, , Disp: , Rfl:     vitamin C (ASCORBIC ACID) 500 MG tablet, Take 1 tablet by mouth daily, Disp: , Rfl:     Omega-3 Fatty Acids (FISH OIL) 300 MG CAPS, Take by mouth, Disp: , Rfl:     Social History     Substance and Sexual Activity   Sexual Activity Yes    Partners: Male       Any bleeding or pain with intercourse: No    Last Yearly:      Last pap:  - normal    Last HPV:  - negative    Do you do self

## 2025-07-30 ENCOUNTER — OFFICE VISIT (OUTPATIENT)
Dept: OBGYN CLINIC | Age: 35
End: 2025-07-30
Payer: COMMERCIAL

## 2025-07-30 ENCOUNTER — HOSPITAL ENCOUNTER (OUTPATIENT)
Age: 35
Setting detail: SPECIMEN
Discharge: HOME | End: 2025-07-30

## 2025-07-30 VITALS
WEIGHT: 133 LBS | HEIGHT: 65 IN | BODY MASS INDEX: 22.16 KG/M2 | DIASTOLIC BLOOD PRESSURE: 76 MMHG | SYSTOLIC BLOOD PRESSURE: 110 MMHG

## 2025-07-30 DIAGNOSIS — Z12.4 SCREENING FOR CERVICAL CANCER: ICD-10-CM

## 2025-07-30 DIAGNOSIS — Z01.419 VISIT FOR GYNECOLOGIC EXAMINATION: Primary | ICD-10-CM

## 2025-07-30 PROCEDURE — 99395 PREV VISIT EST AGE 18-39: CPT | Performed by: ADVANCED PRACTICE MIDWIFE

## 2025-07-31 LAB
HPV I/H RISK 4 DNA CVX QL NAA+PROBE: NOT DETECTED
HPV SAMPLE: NORMAL
HPV, INTERPRETATION: NORMAL
HPV16 DNA CVX QL NAA+PROBE: NOT DETECTED
HPV18 DNA CVX QL NAA+PROBE: NOT DETECTED
SPECIMEN DESCRIPTION: NORMAL

## 2025-08-15 LAB — CYTOLOGY REPORT: NORMAL
